# Patient Record
Sex: FEMALE | Race: BLACK OR AFRICAN AMERICAN | NOT HISPANIC OR LATINO | URBAN - METROPOLITAN AREA
[De-identification: names, ages, dates, MRNs, and addresses within clinical notes are randomized per-mention and may not be internally consistent; named-entity substitution may affect disease eponyms.]

---

## 2021-06-25 ENCOUNTER — INPATIENT (INPATIENT)
Facility: HOSPITAL | Age: 86
LOS: 3 days | Discharge: HOME | End: 2021-06-29
Attending: INTERNAL MEDICINE | Admitting: INTERNAL MEDICINE
Payer: MEDICARE

## 2021-06-25 VITALS
HEIGHT: 61 IN | HEART RATE: 73 BPM | RESPIRATION RATE: 15 BRPM | SYSTOLIC BLOOD PRESSURE: 139 MMHG | TEMPERATURE: 98 F | OXYGEN SATURATION: 100 % | DIASTOLIC BLOOD PRESSURE: 76 MMHG

## 2021-06-25 DIAGNOSIS — Z98.89 OTHER SPECIFIED POSTPROCEDURAL STATES: Chronic | ICD-10-CM

## 2021-06-25 DIAGNOSIS — R55 SYNCOPE AND COLLAPSE: ICD-10-CM

## 2021-06-25 LAB
ALBUMIN SERPL ELPH-MCNC: 4.7 G/DL — SIGNIFICANT CHANGE UP (ref 3.3–5)
ALP SERPL-CCNC: 77 U/L — SIGNIFICANT CHANGE UP (ref 40–120)
ALT FLD-CCNC: 20 U/L — SIGNIFICANT CHANGE UP (ref 4–33)
ANION GAP SERPL CALC-SCNC: 15 MMOL/L — HIGH (ref 7–14)
APPEARANCE UR: CLEAR — SIGNIFICANT CHANGE UP
AST SERPL-CCNC: 19 U/L — SIGNIFICANT CHANGE UP (ref 4–32)
BASOPHILS # BLD AUTO: 0.03 K/UL — SIGNIFICANT CHANGE UP (ref 0–0.2)
BASOPHILS NFR BLD AUTO: 0.7 % — SIGNIFICANT CHANGE UP (ref 0–2)
BILIRUB SERPL-MCNC: 0.4 MG/DL — SIGNIFICANT CHANGE UP (ref 0.2–1.2)
BILIRUB UR-MCNC: NEGATIVE — SIGNIFICANT CHANGE UP
BLOOD GAS VENOUS COMPREHENSIVE RESULT: SIGNIFICANT CHANGE UP
BUN SERPL-MCNC: 16 MG/DL — SIGNIFICANT CHANGE UP (ref 7–23)
CALCIUM SERPL-MCNC: 10.6 MG/DL — HIGH (ref 8.4–10.5)
CHLORIDE SERPL-SCNC: 106 MMOL/L — SIGNIFICANT CHANGE UP (ref 98–107)
CO2 SERPL-SCNC: 21 MMOL/L — LOW (ref 22–31)
COLOR SPEC: COLORLESS — SIGNIFICANT CHANGE UP
CREAT SERPL-MCNC: 1 MG/DL — SIGNIFICANT CHANGE UP (ref 0.5–1.3)
DIFF PNL FLD: NEGATIVE — SIGNIFICANT CHANGE UP
EOSINOPHIL # BLD AUTO: 0.22 K/UL — SIGNIFICANT CHANGE UP (ref 0–0.5)
EOSINOPHIL NFR BLD AUTO: 5.3 % — SIGNIFICANT CHANGE UP (ref 0–6)
GLUCOSE SERPL-MCNC: 100 MG/DL — HIGH (ref 70–99)
GLUCOSE UR QL: NEGATIVE — SIGNIFICANT CHANGE UP
HCT VFR BLD CALC: 42.2 % — SIGNIFICANT CHANGE UP (ref 34.5–45)
HGB BLD-MCNC: 14.2 G/DL — SIGNIFICANT CHANGE UP (ref 11.5–15.5)
IANC: 2.19 K/UL — SIGNIFICANT CHANGE UP (ref 1.5–8.5)
IMM GRANULOCYTES NFR BLD AUTO: 0.5 % — SIGNIFICANT CHANGE UP (ref 0–1.5)
KETONES UR-MCNC: NEGATIVE — SIGNIFICANT CHANGE UP
LEUKOCYTE ESTERASE UR-ACNC: NEGATIVE — SIGNIFICANT CHANGE UP
LYMPHOCYTES # BLD AUTO: 1.24 K/UL — SIGNIFICANT CHANGE UP (ref 1–3.3)
LYMPHOCYTES # BLD AUTO: 30 % — SIGNIFICANT CHANGE UP (ref 13–44)
MCHC RBC-ENTMCNC: 32.1 PG — SIGNIFICANT CHANGE UP (ref 27–34)
MCHC RBC-ENTMCNC: 33.6 GM/DL — SIGNIFICANT CHANGE UP (ref 32–36)
MCV RBC AUTO: 95.5 FL — SIGNIFICANT CHANGE UP (ref 80–100)
MONOCYTES # BLD AUTO: 0.44 K/UL — SIGNIFICANT CHANGE UP (ref 0–0.9)
MONOCYTES NFR BLD AUTO: 10.6 % — SIGNIFICANT CHANGE UP (ref 2–14)
NEUTROPHILS # BLD AUTO: 2.19 K/UL — SIGNIFICANT CHANGE UP (ref 1.8–7.4)
NEUTROPHILS NFR BLD AUTO: 52.9 % — SIGNIFICANT CHANGE UP (ref 43–77)
NITRITE UR-MCNC: NEGATIVE — SIGNIFICANT CHANGE UP
NRBC # BLD: 0 /100 WBCS — SIGNIFICANT CHANGE UP
NRBC # FLD: 0 K/UL — SIGNIFICANT CHANGE UP
PH UR: 7.5 — SIGNIFICANT CHANGE UP (ref 5–8)
PLATELET # BLD AUTO: 127 K/UL — LOW (ref 150–400)
POTASSIUM SERPL-MCNC: 4.7 MMOL/L — SIGNIFICANT CHANGE UP (ref 3.5–5.3)
POTASSIUM SERPL-SCNC: 4.7 MMOL/L — SIGNIFICANT CHANGE UP (ref 3.5–5.3)
PROT SERPL-MCNC: 8.3 G/DL — SIGNIFICANT CHANGE UP (ref 6–8.3)
PROT UR-MCNC: ABNORMAL
RBC # BLD: 4.42 M/UL — SIGNIFICANT CHANGE UP (ref 3.8–5.2)
RBC # FLD: 13.3 % — SIGNIFICANT CHANGE UP (ref 10.3–14.5)
SARS-COV-2 RNA SPEC QL NAA+PROBE: SIGNIFICANT CHANGE UP
SODIUM SERPL-SCNC: 142 MMOL/L — SIGNIFICANT CHANGE UP (ref 135–145)
SP GR SPEC: 1.01 — LOW (ref 1.01–1.02)
TROPONIN T, HIGH SENSITIVITY RESULT: 21 NG/L — SIGNIFICANT CHANGE UP
TROPONIN T, HIGH SENSITIVITY RESULT: 23 NG/L — SIGNIFICANT CHANGE UP
UROBILINOGEN FLD QL: SIGNIFICANT CHANGE UP
WBC # BLD: 4.14 K/UL — SIGNIFICANT CHANGE UP (ref 3.8–10.5)
WBC # FLD AUTO: 4.14 K/UL — SIGNIFICANT CHANGE UP (ref 3.8–10.5)

## 2021-06-25 PROCEDURE — 99285 EMERGENCY DEPT VISIT HI MDM: CPT | Mod: 25

## 2021-06-25 PROCEDURE — 99223 1ST HOSP IP/OBS HIGH 75: CPT

## 2021-06-25 PROCEDURE — 93010 ELECTROCARDIOGRAM REPORT: CPT

## 2021-06-25 PROCEDURE — 71045 X-RAY EXAM CHEST 1 VIEW: CPT | Mod: 26

## 2021-06-25 RX ORDER — PENTOXIFYLLINE 400 MG
400 TABLET, EXTENDED RELEASE ORAL
Refills: 0 | Status: DISCONTINUED | OUTPATIENT
Start: 2021-06-25 | End: 2021-06-29

## 2021-06-25 RX ORDER — BRIMONIDINE TARTRATE 2 MG/MG
1 SOLUTION/ DROPS OPHTHALMIC
Qty: 0 | Refills: 0 | DISCHARGE

## 2021-06-25 RX ORDER — ENOXAPARIN SODIUM 100 MG/ML
40 INJECTION SUBCUTANEOUS DAILY
Refills: 0 | Status: DISCONTINUED | OUTPATIENT
Start: 2021-06-25 | End: 2021-06-29

## 2021-06-25 RX ORDER — SPIRONOLACTONE 25 MG/1
25 TABLET, FILM COATED ORAL
Refills: 0 | Status: DISCONTINUED | OUTPATIENT
Start: 2021-06-25 | End: 2021-06-27

## 2021-06-25 RX ORDER — ALLOPURINOL 300 MG
1 TABLET ORAL
Qty: 0 | Refills: 0 | DISCHARGE

## 2021-06-25 RX ORDER — METOPROLOL TARTRATE 50 MG
1 TABLET ORAL
Qty: 0 | Refills: 0 | DISCHARGE

## 2021-06-25 RX ORDER — METOPROLOL TARTRATE 50 MG
25 TABLET ORAL DAILY
Refills: 0 | Status: DISCONTINUED | OUTPATIENT
Start: 2021-06-25 | End: 2021-06-29

## 2021-06-25 RX ORDER — METHIMAZOLE 10 MG/1
1 TABLET ORAL
Qty: 0 | Refills: 0 | DISCHARGE

## 2021-06-25 RX ORDER — AMLODIPINE BESYLATE 2.5 MG/1
10 TABLET ORAL AT BEDTIME
Refills: 0 | Status: DISCONTINUED | OUTPATIENT
Start: 2021-06-25 | End: 2021-06-26

## 2021-06-25 RX ORDER — PENTOXIFYLLINE 400 MG
1 TABLET, EXTENDED RELEASE ORAL
Qty: 0 | Refills: 0 | DISCHARGE

## 2021-06-25 RX ORDER — ALLOPURINOL 300 MG
100 TABLET ORAL AT BEDTIME
Refills: 0 | Status: DISCONTINUED | OUTPATIENT
Start: 2021-06-25 | End: 2021-06-29

## 2021-06-25 RX ORDER — BRIMONIDINE TARTRATE 2 MG/MG
1 SOLUTION/ DROPS OPHTHALMIC THREE TIMES A DAY
Refills: 0 | Status: DISCONTINUED | OUTPATIENT
Start: 2021-06-25 | End: 2021-06-29

## 2021-06-25 NOTE — ED ADULT TRIAGE NOTE - CHIEF COMPLAINT QUOTE
pt coming in from urgent care for an abnormal EKG. pt went to urgent care c/o dizziness which now subsided. pt appears to be in no distress. denies pain at this time, sob, dizziness, headache, lightheaded, nausea. hx. HTN, leaky valve.

## 2021-06-25 NOTE — ED PROVIDER NOTE - OBJECTIVE STATEMENT
89yo F pmhx HTN, HLD pw with episodes of dizziness    States yesterday and today had episode of light-headedness and weakness along with shoulder pain, today occurred while moving plants. no f/c, no n/v/d.   ECHO in january with dr keith eng which was normal.   does not smoke or drink

## 2021-06-25 NOTE — ED PROVIDER NOTE - ATTENDING CONTRIBUTION TO CARE
I have personally performed a face to face bedside history and physical examination of this patient. I have discussed the history, examination, review of systems, assessment and plan of management with the resident. I have reviewed the electronic medical record and amended it to reflect my history, review of systems, physical exam, assessment and plan.    Brief HPI:  89 yo F pmhx HTN, HLD presents for episodes of dizziness and reported abnormal ekg at urgent care prior to arrival.  Patient with episodes of dizziness not described as lightheadedness or room spinning sensation starting one day ago.  Currently improved.  Denies cp, sob, nausea, vomiting.  Went to urgent care today who told patient she had abnormal ekg and should come to ED.      Vitals:   Reviewed    Exam:    GEN:  Non-toxic appearing, non-distressed, speaking full sentences, non-diaphoretic, AAOx3  HEENT:  NCAT, neck supple, EOMI, PERRLA, sclera anicteric, no conjunctival pallor or injection, no stridor, normal voice, no tonsillar exudate, uvula midline  CV:  regular rhythm and rate, s1/s2 audible, no murmurs, rubs or gallops, peripheral pulses 2+ and symmetric  PULM:  non-labored respirations, lungs clear to auscultation bilaterally, no wheezes, crackles or rales  ABD:  non distended, non-tender, no rebound, no guarding, negative Boyd's sign, bowel sounds normal, no cvat  MSK:  no gross deformity, non-tender extremities and joints, range of motion grossly normal appearing, no extremity edema, extremities warm and well perfused   NEURO:  AAOx3, CN II-XII intact, motor 5/5 in upper and lower extremities bilaterally, sensation grossly intact in extremities and trunk, finger to nose testing wnl, no nystagmus, negative Romberg, no pronator drift, no gait deficit  SKIN:  warm, dry, no rash or vesicles     A/P:  89 yo F pmhx HTN, HLD presents for episodes of dizziness and reported abnormal ekg at urgent care prior to arrival.  Patient with episodes of dizziness not described as lightheadedness or room spinning sensation starting one day ago.  EKG sinus with age indeterminant infarct (q waves in precordium).  No e/o active ischemia.  Low c/f acute cta.  No apparent infection on exam or per history. Low c/f ACS at this time given resolved dizziness and no chest pain.  Plan for labs, cxr.  Dispo pending.

## 2021-06-25 NOTE — ED PROVIDER NOTE - CLINICAL SUMMARY MEDICAL DECISION MAKING FREE TEXT BOX
Rene PGY-3:  89yo F w/ pmhx as described in HPI here with ekg showing anterior q waves no previous to compare but indicating CAD here with pre-syncope, considering age and RF anticipate likely admission for further workup/management

## 2021-06-25 NOTE — ED PROVIDER NOTE - INTERNATIONAL TRAVEL
ANTICOAGULATION FOLLOW-UP CLINIC VISIT    Patient Name:  Zoey Jacobs  Date:  11/5/2018  Contact Type:  Face to Face    SUBJECTIVE:     Patient Findings     Positives No Problem Findings           OBJECTIVE    INR Protime   Date Value Ref Range Status   11/05/2018 2.5 2.0 - 3.0 Final       ASSESSMENT / PLAN  INR assessment THER    Recheck INR In: 5 WEEKS    INR Location Clinic      Anticoagulation Summary as of 11/5/2018     INR goal 2.0-3.0   Today's INR 2.5   Warfarin maintenance plan 5 mg (5 mg x 1) on Mon, Wed, Fri; 2.5 mg (5 mg x 0.5) all other days   Full warfarin instructions 5 mg on Mon, Wed, Fri; 2.5 mg all other days   Weekly warfarin total 25 mg   Plan last modified Maryuri Vines RN (3/19/2018)   Next INR check 12/10/2018   Priority INR   Target end date Indefinite    Indications   Anticoagulation monitoring  INR range 2-3 [Z79.01]  Paroxysmal atrial fibrillation (H) [I48.0]         Anticoagulation Episode Summary     INR check location     Preferred lab     Send INR reminders to  INR    Comments       Anticoagulation Care Providers     Provider Role Specialty Phone number    Vijay Mackay MD [facility] [phone]            See the Encounter Report to view Anticoagulation Flowsheet and Dosing Calendar (Go to Encounters tab in chart review, and find the Anticoagulation Therapy Visit)        Maryuri Vines RN                No

## 2021-06-25 NOTE — ED PROVIDER NOTE - PHYSICAL EXAMINATION
General: well appearing, interactive, well nourished, NAD  HEENT: normal external ears bilaterally   Cardiac: RRR, no MRG appreciated  Resp: lungs clear to auscultation bilaterally, symmetric chest wall rise  Abd: soft, nontender, nondistended,   : no CVA tenderness  Neuro: Moving all extremities  Skin:  normal color for patient

## 2021-06-26 DIAGNOSIS — Z29.9 ENCOUNTER FOR PROPHYLACTIC MEASURES, UNSPECIFIED: ICD-10-CM

## 2021-06-26 DIAGNOSIS — M25.519 PAIN IN UNSPECIFIED SHOULDER: ICD-10-CM

## 2021-06-26 DIAGNOSIS — R42 DIZZINESS AND GIDDINESS: ICD-10-CM

## 2021-06-26 DIAGNOSIS — I10 ESSENTIAL (PRIMARY) HYPERTENSION: ICD-10-CM

## 2021-06-26 DIAGNOSIS — E05.90 THYROTOXICOSIS, UNSPECIFIED WITHOUT THYROTOXIC CRISIS OR STORM: ICD-10-CM

## 2021-06-26 DIAGNOSIS — D69.6 THROMBOCYTOPENIA, UNSPECIFIED: ICD-10-CM

## 2021-06-26 DIAGNOSIS — M10.9 GOUT, UNSPECIFIED: ICD-10-CM

## 2021-06-26 LAB
A1C WITH ESTIMATED AVERAGE GLUCOSE RESULT: 5.5 % — SIGNIFICANT CHANGE UP (ref 4–5.6)
ANION GAP SERPL CALC-SCNC: 12 MMOL/L — SIGNIFICANT CHANGE UP (ref 7–14)
BUN SERPL-MCNC: 17 MG/DL — SIGNIFICANT CHANGE UP (ref 7–23)
CALCIUM SERPL-MCNC: 10.2 MG/DL — SIGNIFICANT CHANGE UP (ref 8.4–10.5)
CHLORIDE SERPL-SCNC: 105 MMOL/L — SIGNIFICANT CHANGE UP (ref 98–107)
CHOLEST SERPL-MCNC: 210 MG/DL — HIGH
CO2 SERPL-SCNC: 23 MMOL/L — SIGNIFICANT CHANGE UP (ref 22–31)
CREAT SERPL-MCNC: 0.96 MG/DL — SIGNIFICANT CHANGE UP (ref 0.5–1.3)
ESTIMATED AVERAGE GLUCOSE: 111 MG/DL — SIGNIFICANT CHANGE UP (ref 68–114)
GLUCOSE SERPL-MCNC: 93 MG/DL — SIGNIFICANT CHANGE UP (ref 70–99)
HCT VFR BLD CALC: 41.5 % — SIGNIFICANT CHANGE UP (ref 34.5–45)
HDLC SERPL-MCNC: 70 MG/DL — SIGNIFICANT CHANGE UP
HGB BLD-MCNC: 14 G/DL — SIGNIFICANT CHANGE UP (ref 11.5–15.5)
LIPID PNL WITH DIRECT LDL SERPL: 123 MG/DL — HIGH
MAGNESIUM SERPL-MCNC: 2.1 MG/DL — SIGNIFICANT CHANGE UP (ref 1.6–2.6)
MCHC RBC-ENTMCNC: 32.6 PG — SIGNIFICANT CHANGE UP (ref 27–34)
MCHC RBC-ENTMCNC: 33.7 GM/DL — SIGNIFICANT CHANGE UP (ref 32–36)
MCV RBC AUTO: 96.5 FL — SIGNIFICANT CHANGE UP (ref 80–100)
NON HDL CHOLESTEROL: 140 MG/DL — HIGH
NRBC # BLD: 0 /100 WBCS — SIGNIFICANT CHANGE UP
NRBC # FLD: 0 K/UL — SIGNIFICANT CHANGE UP
PHOSPHATE SERPL-MCNC: 2.8 MG/DL — SIGNIFICANT CHANGE UP (ref 2.5–4.5)
PLATELET # BLD AUTO: 136 K/UL — LOW (ref 150–400)
POTASSIUM SERPL-MCNC: 4.2 MMOL/L — SIGNIFICANT CHANGE UP (ref 3.5–5.3)
POTASSIUM SERPL-SCNC: 4.2 MMOL/L — SIGNIFICANT CHANGE UP (ref 3.5–5.3)
RBC # BLD: 4.3 M/UL — SIGNIFICANT CHANGE UP (ref 3.8–5.2)
RBC # FLD: 13.4 % — SIGNIFICANT CHANGE UP (ref 10.3–14.5)
SODIUM SERPL-SCNC: 140 MMOL/L — SIGNIFICANT CHANGE UP (ref 135–145)
TRIGL SERPL-MCNC: 83 MG/DL — SIGNIFICANT CHANGE UP
TSH SERPL-MCNC: 2.69 UIU/ML — SIGNIFICANT CHANGE UP (ref 0.27–4.2)
WBC # BLD: 4.95 K/UL — SIGNIFICANT CHANGE UP (ref 3.8–10.5)
WBC # FLD AUTO: 4.95 K/UL — SIGNIFICANT CHANGE UP (ref 3.8–10.5)

## 2021-06-26 RX ORDER — AMLODIPINE BESYLATE 2.5 MG/1
5 TABLET ORAL AT BEDTIME
Refills: 0 | Status: DISCONTINUED | OUTPATIENT
Start: 2021-06-26 | End: 2021-06-29

## 2021-06-26 RX ADMIN — BRIMONIDINE TARTRATE 1 DROP(S): 2 SOLUTION/ DROPS OPHTHALMIC at 14:29

## 2021-06-26 RX ADMIN — Medication 400 MILLIGRAM(S): at 06:14

## 2021-06-26 RX ADMIN — Medication 400 MILLIGRAM(S): at 17:21

## 2021-06-26 RX ADMIN — BRIMONIDINE TARTRATE 1 DROP(S): 2 SOLUTION/ DROPS OPHTHALMIC at 22:41

## 2021-06-26 RX ADMIN — BRIMONIDINE TARTRATE 1 DROP(S): 2 SOLUTION/ DROPS OPHTHALMIC at 06:15

## 2021-06-26 RX ADMIN — AMLODIPINE BESYLATE 5 MILLIGRAM(S): 2.5 TABLET ORAL at 22:41

## 2021-06-26 RX ADMIN — Medication 100 MILLIGRAM(S): at 22:41

## 2021-06-26 RX ADMIN — Medication 25 MILLIGRAM(S): at 09:06

## 2021-06-26 NOTE — H&P ADULT - NSHPREVIEWOFSYSTEMS_GEN_ALL_CORE
CONSTITUTIONAL:  No weight loss, fever, chills, weakness or fatigue.  HEENT:  Eyes:  No visual loss, blurred vision, double vision or yellow sclerae. Ears, Nose, Throat:  No hearing loss, sneezing, congestion, runny nose or sore throat.  SKIN:  No rash or itching.  CARDIOVASCULAR:  No chest pain, chest pressure or chest discomfort. No palpitations or edema.  RESPIRATORY:  No shortness of breath, cough or sputum.  GASTROINTESTINAL:  No anorexia, nausea, vomiting or diarrhea. No abdominal pain or blood.  GENITOURINARY:  Denies hematuria, dysuria.   NEUROLOGICAL:  +dizziness +peripheral neuropathy No headache, syncope, paralysis, ataxia. No change in bowel or bladder control.  MUSCULOSKELETAL:  +shoulder pain No muscle, back pain.  HEMATOLOGIC:  No anemia, bleeding or bruising.  LYMPHATICS:  No enlarged nodes. No history of splenectomy.  PSYCHIATRIC:  No history of depression or anxiety.  ENDOCRINOLOGIC:  No reports of sweating, cold or heat intolerance. No polyuria or polydipsia.  ALLERGIES:  No history of asthma, hives, eczema or rhinitis.

## 2021-06-26 NOTE — H&P ADULT - PROBLEM SELECTOR PLAN 2
-likely MSK strain. Full ROM without pain at present. Xray of the shoulder wnl to my eye  -pain control  -PT juan manuel

## 2021-06-26 NOTE — H&P ADULT - HISTORY OF PRESENT ILLNESS
89yo F pmhx HTN, HLD, hyperthyroidism (on methimazole), gout, PVD?, peripheral neuropathy pw with episodes of dizziness/lightheadedness. Pt reports long history of dizziness that occurs when standing. Denies room spinning, instead reports a sensation of lightheadedness. Pt states episodes do not occur when sitting/laying down. Episodes usually resolve after a few minutes of sitting. Denies associated chest pain, blackening of vision, palpitations or SOB. Denies fevers, chills, or focal weakness. Ambulates with a cane. Reports baseline neuropathy in toes. Pt also reporting acute onset of right shoulder pain that started after moving plants. Denies any acute trauma. Pain currently resolved without intervention. Of note, pt had an TTE in january with dr keith eng that was reportedly normal.    no blurred vision

## 2021-06-26 NOTE — PATIENT PROFILE ADULT - PHONE #
----- Message from Brittany Prater sent at 10/16/2020 11:34 AM CDT -----  Regarding: Medications  Contact: Patient  Type: Needs Medical Advice  Who Called:  Patient    Pharmacy name and phone #:   [id number] - [place], [place] - [address];  Best Call Back Number: [phone]  Additional Information: patient called and stated he just picked up one prescription from the pharmacy he thought there was supposed to be two medications. He would like a call from the office to clarify. Thank you!      
Clarified prescription with patient and patient verbalized understanding  
[phone]

## 2021-06-26 NOTE — PHYSICAL THERAPY INITIAL EVALUATION ADULT - PERTINENT HX OF CURRENT PROBLEM, REHAB EVAL
89 year old Female pmhx HTN, HLD, hyperthyroidism, gout, peripheral neuropathy presents with episodes of dizziness/lightheadedness

## 2021-06-26 NOTE — CONSULT NOTE ADULT - ASSESSMENT
89yo F pmhx HTN, HLD, hyperthyroidism (on methimazole), gout, PVD?, peripheral neuropathy pw with episodes of dizziness/lightheadedness likely 2/2 postural hypotension in the setting of polypharmacy   Nephrology consulted for hypercalcemia       Hypercalcemia  etiology?  check PTH, PO4, Vitamin D   Consider gentle hydration ( Ns at 50cc/hr for 1 day )  Monitor serum calcium     HTN  BP stable  Monitor BP    Proteinuria  Repeat UA  Monitor at present

## 2021-06-26 NOTE — H&P ADULT - NSHPPHYSICALEXAM_GEN_ALL_CORE
GENERAL APPEARANCE: Well developed, frail, alert and cooperative. NAD.   HEENT:  PERRL, EOMI. External auditory canals normal, hearing grossly intact.  NECK: Neck supple, non-tender without lymphadenopathy, masses or thyromegaly.  CARDIAC: Normal S1 and S2. No S3, S4 or murmurs. Rhythm is regular.  LUNGS: Clear to auscultation and percussion without rales, rhonchi, wheezing or diminished breath sounds.  ABDOMEN: Positive bowel sounds. Soft, nondistended, nontender. No guarding or rebound.   MUSCULOSKELETAL: ROM intact of shoulder, no pain at present, ROM intact spine and extremities. No joint erythema or tenderness.   BACK: normal posture, no spinal deformity, symmetry of spinal muscles, without tenderness, decreased range of motion.  EXTREMITIES: No significant deformity or joint abnormality. No edema. Peripheral pulses intact. No varicosities.  NEUROLOGICAL: CN II-XII intact. Strength and sensation symmetric and intact throughout. Mild horizontal nystagmus   SKIN: Small hematoma on right wrist over radial artery. Tophi on multiple fingers  PSYCHIATRIC: AOx3.Normal affect and behavior. GENERAL APPEARANCE: Well developed, frail, alert and cooperative. NAD.   HEENT:  PERRL, EOMI. External auditory canals normal, hearing grossly intact.  NECK: Neck supple, non-tender without lymphadenopathy, masses or thyromegaly.  CARDIAC: Normal S1 and S2. No S3, S4 or murmurs. Rhythm is regular.  LUNGS: Clear to auscultation and percussion without rales, rhonchi, wheezing or diminished breath sounds.  ABDOMEN: Positive bowel sounds. Soft, nondistended, nontender. No guarding or rebound.   MUSCULOSKELETAL: ROM intact of shoulder, no pain at present, ROM intact spine and extremities. No joint erythema or tenderness.   BACK: normal posture, no spinal deformity, symmetry of spinal muscles, without tenderness, decreased range of motion.  EXTREMITIES: No significant deformity or joint abnormality. No edema. Peripheral pulses intact. No varicosities.  NEUROLOGICAL: CN II-XII intact. Strength and sensation symmetric and intact throughout. Mild horizontal nystagmus   SKIN: Small hematoma on right wrist over radial artery without numbness, tingling or pain. Tophi on multiple fingers  PSYCHIATRIC: AOx3.Normal affect and behavior.

## 2021-06-26 NOTE — H&P ADULT - ASSESSMENT
89yo F pmhx HTN, HLD, hyperthyroidism (on methimazole), gout, PVD?, peripheral neuropathy pw with episodes of dizziness/lightheadedness likely 2/2 postural hypotension in the setting of polypharmacy

## 2021-06-26 NOTE — H&P ADULT - NSICDXPASTMEDICALHX_GEN_ALL_CORE_FT
PAST MEDICAL HISTORY:  Gout     HTN (hypertension)     Hyperthyroidism     Osteoporosis     Pulmonary embolism

## 2021-06-26 NOTE — H&P ADULT - NSHPLABSRESULTS_GEN_ALL_CORE
( @ 20:28)                      14.2  4.14 )-----------( 127                 42.2    Neutrophils = 2.19 (52.9%)  Lymphocytes = 1.24 (30.0%)  Eosinophils = 0.22 (5.3%)  Basophils = 0.03 (0.7%)  Monocytes = 0.44 (10.6%)  Bands = --%        142  |  106  |  16  ----------------------------<  100<H>  4.7   |  21<L>  |  1.00    Ca    10.6<H>      2021 20:28    TPro  8.3  /  Alb  4.7  /  TBili  0.4  /  DBili  x   /  AST  19  /  ALT  20  /  AlkPhos  77          Venous Blood Gas:   @ 20:28  7.43/38/37/25/70.7  VBG Lactate: 1.6        Urinalysis Basic - ( 2021 21:31 )    Color: Colorless / Appearance: Clear / S.008 / pH: x  Gluc: x / Ketone: Negative  / Bili: Negative / Urobili: <2 mg/dL   Blood: x / Protein: Trace / Nitrite: Negative   Leuk Esterase: Negative / RBC: x / WBC x   Sq Epi: x / Non Sq Epi: x / Bacteria: x    < from: Xray Chest 1 View AP/PA (21 @ 20:48) >    INTERPRETATION:  Questionable retrocardiac opacity. Lateral radiograph of the chest is recommended for further characterization.    < end of copied text >    Labs and imaging reviewed   EKG: NSR, no acute ST changes

## 2021-06-26 NOTE — CONSULT NOTE ADULT - ASSESSMENT
89yo F pmhx HTN, HLD, hyperthyroidism (on methimazole), gout, PVD?, peripheral neuropathy pw with episodes of dizziness/lightheadedness likely 2/2 postural hypotension in the setting of polypharmacy     # Lightheadedness.   -hold spironolactone for today and tomorrow. Reduce amlodipine to 5 mg   -check orthostatics   -TTE to further evaluate  -monitor on telemetry  -PT eval.     # Shoulder pain.    -likely MSK strain. Full ROM without pain at present. Xray of the shoulder wnl   -pain control: tylenol  -PT eval.     # Hyperthyroidism.   -c/w methimazole  -TSH pending.     HTN (hypertension).    -c/w metoprolol, reduce amlodipine  -monitor BP.     Thrombocytopenia.    -perhaps 2/2 mild MDS  -no signs of bleeding. Trend.     # Gout.   -no active disease presently   -c/w allopurinol.    Need for prophylactic measure.  Plan: DVT ppx: lovenox.

## 2021-06-26 NOTE — H&P ADULT - PROBLEM SELECTOR PLAN 1
-Pt reports episodes of dizziness/lightheadedness that occur only when standing. Resolve with sitting. No focal deficits on exam.  -lower suspicion for central etiology of dizziness. Suspect likely postural/orthostatic hypotension in the setting of multiple BP meds (amlodipine and metoprolol) and diuretics (unclear why, no HF history, will need to clarify in AM)  -hold spironolactone for today and tomorrow. Reduce amlodipine to 5 mg   -check orthostatics   -TTE to further evaluate  -UA clean. CXR with possible opacity however pt denies cough. No other localizing symptoms of infx   -monitor on telemetry  -PT eval

## 2021-06-27 LAB
24R-OH-CALCIDIOL SERPL-MCNC: 32 NG/ML — SIGNIFICANT CHANGE UP (ref 30–80)
ANION GAP SERPL CALC-SCNC: 14 MMOL/L — SIGNIFICANT CHANGE UP (ref 7–14)
BUN SERPL-MCNC: 23 MG/DL — SIGNIFICANT CHANGE UP (ref 7–23)
CALCIUM SERPL-MCNC: 9.8 MG/DL — SIGNIFICANT CHANGE UP (ref 8.4–10.5)
CHLORIDE SERPL-SCNC: 104 MMOL/L — SIGNIFICANT CHANGE UP (ref 98–107)
CHOLEST SERPL-MCNC: 193 MG/DL — SIGNIFICANT CHANGE UP
CO2 SERPL-SCNC: 20 MMOL/L — LOW (ref 22–31)
COVID-19 SPIKE DOMAIN AB INTERP: POSITIVE
COVID-19 SPIKE DOMAIN ANTIBODY RESULT: >250 U/ML — HIGH
CREAT SERPL-MCNC: 1.04 MG/DL — SIGNIFICANT CHANGE UP (ref 0.5–1.3)
GLUCOSE SERPL-MCNC: 101 MG/DL — HIGH (ref 70–99)
HCT VFR BLD CALC: 39.5 % — SIGNIFICANT CHANGE UP (ref 34.5–45)
HDLC SERPL-MCNC: 64 MG/DL — SIGNIFICANT CHANGE UP
HGB BLD-MCNC: 13.2 G/DL — SIGNIFICANT CHANGE UP (ref 11.5–15.5)
LIPID PNL WITH DIRECT LDL SERPL: 116 MG/DL — HIGH
MCHC RBC-ENTMCNC: 32.3 PG — SIGNIFICANT CHANGE UP (ref 27–34)
MCHC RBC-ENTMCNC: 33.4 GM/DL — SIGNIFICANT CHANGE UP (ref 32–36)
MCV RBC AUTO: 96.6 FL — SIGNIFICANT CHANGE UP (ref 80–100)
NON HDL CHOLESTEROL: 129 MG/DL — SIGNIFICANT CHANGE UP
NRBC # BLD: 0 /100 WBCS — SIGNIFICANT CHANGE UP
NRBC # FLD: 0 K/UL — SIGNIFICANT CHANGE UP
PHOSPHATE SERPL-MCNC: 2.8 MG/DL — SIGNIFICANT CHANGE UP (ref 2.5–4.5)
PLATELET # BLD AUTO: 123 K/UL — LOW (ref 150–400)
POTASSIUM SERPL-MCNC: 3.9 MMOL/L — SIGNIFICANT CHANGE UP (ref 3.5–5.3)
POTASSIUM SERPL-SCNC: 3.9 MMOL/L — SIGNIFICANT CHANGE UP (ref 3.5–5.3)
PTH-INTACT FLD-MCNC: 102 PG/ML — HIGH (ref 15–65)
RBC # BLD: 4.09 M/UL — SIGNIFICANT CHANGE UP (ref 3.8–5.2)
RBC # FLD: 13.2 % — SIGNIFICANT CHANGE UP (ref 10.3–14.5)
SARS-COV-2 IGG+IGM SERPL QL IA: >250 U/ML — HIGH
SARS-COV-2 IGG+IGM SERPL QL IA: POSITIVE
SODIUM SERPL-SCNC: 138 MMOL/L — SIGNIFICANT CHANGE UP (ref 135–145)
TRIGL SERPL-MCNC: 65 MG/DL — SIGNIFICANT CHANGE UP
WBC # BLD: 4.43 K/UL — SIGNIFICANT CHANGE UP (ref 3.8–10.5)
WBC # FLD AUTO: 4.43 K/UL — SIGNIFICANT CHANGE UP (ref 3.8–10.5)

## 2021-06-27 RX ADMIN — AMLODIPINE BESYLATE 5 MILLIGRAM(S): 2.5 TABLET ORAL at 21:29

## 2021-06-27 RX ADMIN — BRIMONIDINE TARTRATE 1 DROP(S): 2 SOLUTION/ DROPS OPHTHALMIC at 21:29

## 2021-06-27 RX ADMIN — BRIMONIDINE TARTRATE 1 DROP(S): 2 SOLUTION/ DROPS OPHTHALMIC at 05:36

## 2021-06-27 RX ADMIN — BRIMONIDINE TARTRATE 1 DROP(S): 2 SOLUTION/ DROPS OPHTHALMIC at 13:17

## 2021-06-27 RX ADMIN — Medication 400 MILLIGRAM(S): at 17:16

## 2021-06-27 RX ADMIN — Medication 400 MILLIGRAM(S): at 05:36

## 2021-06-27 RX ADMIN — Medication 25 MILLIGRAM(S): at 05:37

## 2021-06-27 RX ADMIN — Medication 100 MILLIGRAM(S): at 21:29

## 2021-06-28 ENCOUNTER — TRANSCRIPTION ENCOUNTER (OUTPATIENT)
Age: 86
End: 2021-06-28

## 2021-06-28 LAB
ANION GAP SERPL CALC-SCNC: 12 MMOL/L — SIGNIFICANT CHANGE UP (ref 7–14)
BUN SERPL-MCNC: 23 MG/DL — SIGNIFICANT CHANGE UP (ref 7–23)
CALCIUM SERPL-MCNC: 10.2 MG/DL — SIGNIFICANT CHANGE UP (ref 8.4–10.5)
CHLORIDE SERPL-SCNC: 106 MMOL/L — SIGNIFICANT CHANGE UP (ref 98–107)
CO2 SERPL-SCNC: 20 MMOL/L — LOW (ref 22–31)
CREAT SERPL-MCNC: 1.05 MG/DL — SIGNIFICANT CHANGE UP (ref 0.5–1.3)
GLUCOSE SERPL-MCNC: 84 MG/DL — SIGNIFICANT CHANGE UP (ref 70–99)
HCT VFR BLD CALC: 40.9 % — SIGNIFICANT CHANGE UP (ref 34.5–45)
HGB BLD-MCNC: 13.8 G/DL — SIGNIFICANT CHANGE UP (ref 11.5–15.5)
MCHC RBC-ENTMCNC: 32.4 PG — SIGNIFICANT CHANGE UP (ref 27–34)
MCHC RBC-ENTMCNC: 33.7 GM/DL — SIGNIFICANT CHANGE UP (ref 32–36)
MCV RBC AUTO: 96 FL — SIGNIFICANT CHANGE UP (ref 80–100)
NRBC # BLD: 0 /100 WBCS — SIGNIFICANT CHANGE UP
NRBC # FLD: 0 K/UL — SIGNIFICANT CHANGE UP
PLATELET # BLD AUTO: 123 K/UL — LOW (ref 150–400)
POTASSIUM SERPL-MCNC: 4 MMOL/L — SIGNIFICANT CHANGE UP (ref 3.5–5.3)
POTASSIUM SERPL-SCNC: 4 MMOL/L — SIGNIFICANT CHANGE UP (ref 3.5–5.3)
RBC # BLD: 4.26 M/UL — SIGNIFICANT CHANGE UP (ref 3.8–5.2)
RBC # FLD: 13.2 % — SIGNIFICANT CHANGE UP (ref 10.3–14.5)
SODIUM SERPL-SCNC: 138 MMOL/L — SIGNIFICANT CHANGE UP (ref 135–145)
VIT D25+D1,25 OH+D1,25 PNL SERPL-MCNC: 47.5 PG/ML — SIGNIFICANT CHANGE UP (ref 19.9–79.3)
WBC # BLD: 5.77 K/UL — SIGNIFICANT CHANGE UP (ref 3.8–10.5)
WBC # FLD AUTO: 5.77 K/UL — SIGNIFICANT CHANGE UP (ref 3.8–10.5)

## 2021-06-28 PROCEDURE — 93306 TTE W/DOPPLER COMPLETE: CPT | Mod: 26

## 2021-06-28 RX ADMIN — BRIMONIDINE TARTRATE 1 DROP(S): 2 SOLUTION/ DROPS OPHTHALMIC at 13:07

## 2021-06-28 RX ADMIN — AMLODIPINE BESYLATE 5 MILLIGRAM(S): 2.5 TABLET ORAL at 21:38

## 2021-06-28 RX ADMIN — BRIMONIDINE TARTRATE 1 DROP(S): 2 SOLUTION/ DROPS OPHTHALMIC at 05:21

## 2021-06-28 RX ADMIN — Medication 25 MILLIGRAM(S): at 05:21

## 2021-06-28 RX ADMIN — BRIMONIDINE TARTRATE 1 DROP(S): 2 SOLUTION/ DROPS OPHTHALMIC at 21:39

## 2021-06-28 RX ADMIN — Medication 400 MILLIGRAM(S): at 18:51

## 2021-06-28 RX ADMIN — Medication 400 MILLIGRAM(S): at 05:21

## 2021-06-28 RX ADMIN — Medication 100 MILLIGRAM(S): at 21:39

## 2021-06-28 NOTE — CONSULT NOTE ADULT - SUBJECTIVE AND OBJECTIVE BOX
[facility] NEPHROLOGY PRACTICE   MD JANI DASILVA MD RUORU WONG, [place]    TEL:  OFFICE: [phone]  DR LOWERY CELL: [phone]  ALEXIS HAINES CELL: [phone]  DR. GILL CELL: [phone]  DR. PINO CELl: [phone]    FROM 5 PM- 7 AM PLEASE CALL ANSWERING SERVICE AT [phone]    -- INITIAL RENAL CONSULT NOTE --- Date Of service 06-26-21 @ 10:43  --------------------------------------------------------------------------------  HPI:    89yo F pmhx HTN, HLD, hyperthyroidism (on methimazole), gout, PVD?, peripheral neuropathy pw with episodes of dizziness/lightheadedness likely 2/2 postural hypotension in the setting of polypharmacy   Nephrology consulted for hypercalcemia     PAST HISTORY  --------------------------------------------------------------------------------  PAST MEDICAL & SURGICAL HISTORY:  HTN (hypertension)    Osteoporosis    Pulmonary embolism    Gout    Hyperthyroidism    S/P appendectomy      FAMILY HISTORY:  No pertinent family history in first degree relatives      PAST SOCIAL HISTORY:    ALLERGIES & MEDICATIONS  --------------------------------------------------------------------------------  Allergies    No Known Allergies    Intolerances      Standing Inpatient Medications  allopurinol 100 milliGRAM(s) Oral at bedtime  amLODIPine   Tablet 5 milliGRAM(s) Oral at bedtime  brimonidine 0.2% Ophthalmic Solution 1 Drop(s) Both EYES three times a day  enoxaparin Injectable 40 milliGRAM(s) SubCutaneous daily  methimazole 5 milliGRAM(s) Oral <User Schedule>  metoprolol succinate ER 25 milliGRAM(s) Oral daily  pentoxifylline 400 milliGRAM(s) Oral two times a day  spironolactone 25 milliGRAM(s) Oral <User Schedule>    PRN Inpatient Medications      REVIEW OF SYSTEMS  --------------------------------------------------------------------------------  Gen: No fevers/chills  Skin: No rashes  Head/Eyes/Ears: Normal hearing,  Normal vision   Respiratory: No dyspnea, cough  CV: No chest pain  GI: No abdominal pain, diarrhea, constipation, nausea, vomiting  : No dysuria, hematuria  MSK: No  edema  Heme: No easy bruising or bleeding  Psych: No significant depression    All other systems were reviewed and are negative, except as noted.    VITALS/PHYSICAL EXAM  --------------------------------------------------------------------------------  T(C): 36.8 (06-26-21 @ 08:53), Max: 37 (06-26-21 @ 06:00)  HR: 68 (06-26-21 @ 08:53) (57 - 75)  BP: 116/68 (06-26-21 @ 08:53) (116/68 - 153/82)  RR: 18 (06-26-21 @ 08:53) (15 - 19)  SpO2: 100% (06-26-21 @ 08:53) (97% - 100%)  Wt(kg): --  Height (cm): 154.9 (06-25-21 @ 17:15)  Weight (kg): 58.6 (06-26-21 @ 06:00)  BMI (kg/m2): 24.4 (06-26-21 @ 06:00)  BSA (m2): 1.57 (06-26-21 @ 06:00)      Physical Exam:  	Gen: NAD  	HEENT: MMM  	Pulm: CTA B/L  	CV: S1S2  	Abd: Soft, +BS   	Ext: No LE edema B/L  	Neuro: Awake, alert  	Skin: Warm and dry  	Vascular access:          : dawson  LABS/STUDIES  --------------------------------------------------------------------------------              14.2   4.14  >-----------<  127      [06-25-21 @ 20:28]              42.2     142  |  106  |  16  ----------------------------<  100      [06-25-21 @ 20:28]  4.7   |  21  |  1.00        Ca     10.6     [06-25-21 @ 20:28]    TPro  8.3  /  Alb  4.7  /  TBili  0.4  /  DBili  x   /  AST  19  /  ALT  20  /  AlkPhos  77  [06-25-21 @ 20:28]          Creatinine Trend:  SCr 1.00 [06-25 @ 20:28]    Urinalysis - [06-25-21 @ 21:31]      Color Colorless / Appearance Clear / SG 1.008 / pH 7.5      Gluc Negative / Ketone Negative  / Bili Negative / Urobili <2 mg/dL       Blood Negative / Protein Trace / Leuk Est Negative / Nitrite Negative      RBC  / WBC  / Hyaline  / Gran  / Sq Epi  / Non Sq Epi  / Bacteria           
89yo F pmhx HTN, HLD, hyperthyroidism (on methimazole), gout, PVD?, peripheral neuropathy pw with episodes of dizziness/lightheadedness. Pt reports long history of dizziness that occurs when standing. Denies room spinning, instead reports a sensation of lightheadedness. Pt states episodes do not occur when sitting/laying down. Episodes usually resolve after a few minutes of sitting. Denies associated chest pain, blackening of vision, palpitations or SOB. Denies fevers, chills, or focal weakness. Ambulates with a cane. Reports baseline neuropathy in toes. Pt also reporting acute onset of right shoulder pain that started after moving plants. Denies any acute trauma. Pain currently resolved without intervention      INTERVAL HPI/OVERNIGHT EVENTS:  T(C): 37.1 (21 @ 21:06), Max: 37.1 (21 @ 21:06)  HR: 65 (21 @ 22:39) (57 - 72)  BP: 117/70 (21 @ 22:39) (116/68 - 141/63)  RR: 17 (21 @ 21:06) (17 - 18)  SpO2: 99% (21 @ 21:06) (97% - 100%)  Wt(kg): --  I&O's Summary      PAST MEDICAL & SURGICAL HISTORY:  HTN (hypertension)    Osteoporosis    Pulmonary embolism    Gout    Hyperthyroidism    S/P appendectomy        SOCIAL HISTORY  Alcohol:  Tobacco:  Illicit substance use:    FAMILY HISTORY:    REVIEW OF SYSTEMS:  CONSTITUTIONAL: No fever, weight loss, or fatigue  EYES: No eye pain, visual disturbances, or discharge  ENMT:  No difficulty hearing, tinnitus, vertigo; No sinus or throat pain  NECK: No pain or stiffness  RESPIRATORY: No cough, wheezing, chills or hemoptysis; No shortness of breath  CARDIOVASCULAR: No chest pain, palpitations, dizziness, or leg swelling  GASTROINTESTINAL: No abdominal or epigastric pain. No nausea, vomiting, or hematemesis; No diarrhea or constipation. No melena or hematochezia.  GENITOURINARY: No dysuria, frequency, hematuria, or incontinence  NEUROLOGICAL: No headaches, memory loss, loss of strength, numbness, or tremors  SKIN: No itching, burning, rashes, or lesions   LYMPH NODES: No enlarged glands  ENDOCRINE: No heat or cold intolerance; No hair loss  MUSCULOSKELETAL: No joint pain or swelling; No muscle, back, or extremity pain  PSYCHIATRIC: No depression, anxiety, mood swings, or difficulty sleeping  HEME/LYMPH: No easy bruising, or bleeding gums  ALLERY AND IMMUNOLOGIC: No hives or eczema    RADIOLOGY & ADDITIONAL TESTS:    Imaging Personally Reviewed:  [ ] YES  [ ] NO    Consultant(s) Notes Reviewed:  [ ] YES  [ ] NO    PHYSICAL EXAM:  GENERAL: NAD, well-groomed, well-developed  HEAD:  Atraumatic, Normocephalic  EYES: EOMI, PERRLA, conjunctiva and sclera clear  ENMT: No tonsillar erythema, exudates, or enlargement; Moist mucous membranes, Good dentition, No lesions  NECK: Supple, No JVD, Normal thyroid  NERVOUS SYSTEM:  Alert & Oriented X3, Good concentration; Motor Strength 5/5 B/L upper and lower extremities; DTRs 2+ intact and symmetric  CHEST/LUNG: Clear to percussion bilaterally; No rales, rhonchi, wheezing, or rubs  HEART: Regular rate and rhythm; No murmurs, rubs, or gallops  ABDOMEN: Soft, Nontender, Nondistended; Bowel sounds present  EXTREMITIES:  2+ Peripheral Pulses, No clubbing, cyanosis, or edema  LYMPH: No lymphadenopathy noted  SKIN: No rashes or lesions    LABS:                        14.0   4.95  )-----------( 136      ( 2021 10:54 )             41.5         140  |  105  |  17  ----------------------------<  93  4.2   |  23  |  0.96    Ca    10.2      2021 10:54  Phos  2.8     -  Mg     2.1         TPro  8.3  /  Alb  4.7  /  TBili  0.4  /  DBili  x   /  AST  19  /  ALT  20  /  AlkPhos  77        Urinalysis Basic - ( 2021 21:31 )    Color: Colorless / Appearance: Clear / S.008 / pH: x  Gluc: x / Ketone: Negative  / Bili: Negative / Urobili: <2 mg/dL   Blood: x / Protein: Trace / Nitrite: Negative   Leuk Esterase: Negative / RBC: x / WBC x   Sq Epi: x / Non Sq Epi: x / Bacteria: x      CAPILLARY BLOOD GLUCOSE            Urinalysis Basic - ( 2021 21:31 )    Color: Colorless / Appearance: Clear / S.008 / pH: x  Gluc: x / Ketone: Negative  / Bili: Negative / Urobili: <2 mg/dL   Blood: x / Protein: Trace / Nitrite: Negative   Leuk Esterase: Negative / RBC: x / WBC x   Sq Epi: x / Non Sq Epi: x / Bacteria: x        MEDICATIONS  (STANDING):  allopurinol 100 milliGRAM(s) Oral at bedtime  amLODIPine   Tablet 5 milliGRAM(s) Oral at bedtime  brimonidine 0.2% Ophthalmic Solution 1 Drop(s) Both EYES three times a day  enoxaparin Injectable 40 milliGRAM(s) SubCutaneous daily  methimazole 5 milliGRAM(s) Oral <User Schedule>  metoprolol succinate ER 25 milliGRAM(s) Oral daily  pentoxifylline 400 milliGRAM(s) Oral two times a day  spironolactone 25 milliGRAM(s) Oral <User Schedule>    MEDICATIONS  (PRN):      Care Discussed with Consultants/Other Providers [ ] YES  [ ] NO
Hilario Fernandez MD  Interventional Cardiology / Advance Heart Failure and Cardiac Transplant Specialist  [place] Office : [address] [place] [address] [place] [address]  Tel:   [place] Office : [address] [facility] [place] [address]  Tel: [phone]  Cell : [phone]    HISTORY OF PRESENTING ILLNESS:  89yo F pmhx HTN, HLD, hyperthyroidism (on methimazole), gout, PVD?, peripheral neuropathy pw with episodes of dizziness/lightheadedness. Pt reports long history of dizziness that occurs when standing. Denies room spinning, instead reports a sensation of lightheadedness. Pt states episodes do not occur when sitting/laying down. Episodes usually resolve after a few minutes of sitting. Denies associated chest pain, blackening of vision, palpitations or SOB. Denies fevers, chills, or focal weakness. Ambulates with a cane. Reports baseline neuropathy in toes. Pt also reporting acute onset of right shoulder pain that started after moving plants. Denies any acute trauma. Pain currently resolved without intervention. Of note, pt had an TTE in january with dr keith eng that was reportedly normal. Patient admitted and noted to be orthostatic positive. Dizziness resolved today.  	  MEDICATIONS:  amLODIPine   Tablet 5 milliGRAM(s) Oral at bedtime  enoxaparin Injectable 40 milliGRAM(s) SubCutaneous daily  metoprolol succinate ER 25 milliGRAM(s) Oral daily  pentoxifylline 400 milliGRAM(s) Oral two times a day            allopurinol 100 milliGRAM(s) Oral at bedtime  methimazole 5 milliGRAM(s) Oral <User Schedule>    brimonidine 0.2% Ophthalmic Solution 1 Drop(s) Both EYES three times a day      PAST MEDICAL/SURGICAL HISTORY  PAST MEDICAL & SURGICAL HISTORY:  HTN (hypertension)    Osteoporosis    Pulmonary embolism    Gout    Hyperthyroidism    S/P appendectomy        SOCIAL HISTORY: Substance Use (street drugs): ( x ) never used  (  ) other:    FAMILY HISTORY:  No pertinent family history in first degree relatives        REVIEW OF SYSTEMS:  CONSTITUTIONAL: No fever, weight loss, or fatigue  EYES: No eye pain, visual disturbances, or discharge  ENMT:  No difficulty hearing, tinnitus, vertigo; No sinus or throat pain  BREASTS: No pain, masses, or nipple discharge  GASTROINTESTINAL: No abdominal or epigastric pain. No nausea, vomiting, or hematemesis; No diarrhea or constipation. No melena or hematochezia.  GENITOURINARY: No dysuria, frequency, hematuria, or incontinence  NEUROLOGICAL: No headaches, memory loss, loss of strength, numbness, or tremors  ENDOCRINE: No heat or cold intolerance; No hair loss  MUSCULOSKELETAL: No joint pain or swelling; No muscle, back, or extremity pain  PSYCHIATRIC: No depression, anxiety, mood swings, or difficulty sleeping  HEME/LYMPH: No easy bruising, or bleeding gums  All others negative    PHYSICAL EXAM:  T(C): 36.2 (06-28-21 @ 12:39), Max: 36.7 (06-27-21 @ 21:23)  HR: 80 (06-28-21 @ 12:39) (71 - 80)  BP: 120/82 (06-28-21 @ 12:39) (120/82 - 130/88)  RR: 18 (06-28-21 @ 12:39) (17 - 18)  SpO2: 100% (06-28-21 @ 12:39) (99% - 100%)  Wt(kg): --  I&O's Summary        GENERAL: NAD  EYES: EOMI, PERRLA, conjunctiva and sclera clear  ENMT: No tonsillar erythema, exudates, or enlargement  Cardiovascular: Normal S1 S2, No JVD, No murmurs, No edema  Respiratory: Lungs clear to auscultation	  Gastrointestinal:  Soft, Non-tender, + BS	  Extremities: No edema                                        13.8   5.77  )-----------( 123      ( 28 Jun 2021 06:49 )             40.9     06-28    138  |  106  |  23  ----------------------------<  84  4.0   |  20<L>  |  1.05    Ca    10.2      28 Jun 2021 06:49  Phos  2.8     06-27      proBNP:   Lipid Profile:   HgA1c:   TSH:     Consultant(s) Notes Reviewed:  [x ] YES  [ ] NO    Care Discussed with Consultants/Other Providers [ x] YES  [ ] NO    Imaging Personally Reviewed independently:  [x] YES  [ ] NO    All labs, radiologic studies, vitals, orders and medications list reviewed. Patient is seen and examined at bedside. Case discussed with medical team.

## 2021-06-28 NOTE — DISCHARGE NOTE NURSING/CASE MANAGEMENT/SOCIAL WORK - PATIENT PORTAL LINK FT
You can access the FollowMyHealth Patient Portal offered by [facility] by registering at the following website: [email or web address]. By joining [facility]’s FollowMyHealth portal, you will also be able to view your health information using other applications (apps) compatible with our system.

## 2021-06-28 NOTE — CONSULT NOTE ADULT - ASSESSMENT
COVERING DR. BURT    Assessment and Plan    89yo F pmhx HTN, HLD, hyperthyroidism (on methimazole), gout, PVD?, peripheral neuropathy pw with episodes of dizziness/lightheadedness. Pt reports long history of dizziness that occurs when standing    EKG: NSR no STT changes    1. Dizziness  -secondary to orthostatic hypotension  -resolved today, orthostatics now negative  -continue to hold aldatone  -echo pending--expedited    2. HTN  -controlled  -c/w metoprolol and norvasc  -continue to monitor BP    3. DVT prophylaxis  -lovenox subq

## 2021-06-29 ENCOUNTER — TRANSCRIPTION ENCOUNTER (OUTPATIENT)
Age: 86
End: 2021-06-29

## 2021-06-29 VITALS
SYSTOLIC BLOOD PRESSURE: 139 MMHG | HEART RATE: 73 BPM | DIASTOLIC BLOOD PRESSURE: 88 MMHG | TEMPERATURE: 99 F | RESPIRATION RATE: 18 BRPM | OXYGEN SATURATION: 100 %

## 2021-06-29 LAB
ANION GAP SERPL CALC-SCNC: 11 MMOL/L — SIGNIFICANT CHANGE UP (ref 7–14)
BUN SERPL-MCNC: 22 MG/DL — SIGNIFICANT CHANGE UP (ref 7–23)
CALCIUM SERPL-MCNC: 9.5 MG/DL — SIGNIFICANT CHANGE UP (ref 8.4–10.5)
CHLORIDE SERPL-SCNC: 106 MMOL/L — SIGNIFICANT CHANGE UP (ref 98–107)
CO2 SERPL-SCNC: 22 MMOL/L — SIGNIFICANT CHANGE UP (ref 22–31)
CREAT SERPL-MCNC: 0.97 MG/DL — SIGNIFICANT CHANGE UP (ref 0.5–1.3)
GLUCOSE SERPL-MCNC: 92 MG/DL — SIGNIFICANT CHANGE UP (ref 70–99)
HCT VFR BLD CALC: 37.4 % — SIGNIFICANT CHANGE UP (ref 34.5–45)
HGB BLD-MCNC: 12.4 G/DL — SIGNIFICANT CHANGE UP (ref 11.5–15.5)
MAGNESIUM SERPL-MCNC: 2.1 MG/DL — SIGNIFICANT CHANGE UP (ref 1.6–2.6)
MCHC RBC-ENTMCNC: 32.2 PG — SIGNIFICANT CHANGE UP (ref 27–34)
MCHC RBC-ENTMCNC: 33.2 GM/DL — SIGNIFICANT CHANGE UP (ref 32–36)
MCV RBC AUTO: 97.1 FL — SIGNIFICANT CHANGE UP (ref 80–100)
NRBC # BLD: 0 /100 WBCS — SIGNIFICANT CHANGE UP
NRBC # FLD: 0 K/UL — SIGNIFICANT CHANGE UP
PHOSPHATE SERPL-MCNC: 3.2 MG/DL — SIGNIFICANT CHANGE UP (ref 2.5–4.5)
PLATELET # BLD AUTO: 103 K/UL — LOW (ref 150–400)
POTASSIUM SERPL-MCNC: 3.9 MMOL/L — SIGNIFICANT CHANGE UP (ref 3.5–5.3)
POTASSIUM SERPL-SCNC: 3.9 MMOL/L — SIGNIFICANT CHANGE UP (ref 3.5–5.3)
RBC # BLD: 3.85 M/UL — SIGNIFICANT CHANGE UP (ref 3.8–5.2)
RBC # FLD: 13 % — SIGNIFICANT CHANGE UP (ref 10.3–14.5)
SODIUM SERPL-SCNC: 139 MMOL/L — SIGNIFICANT CHANGE UP (ref 135–145)
WBC # BLD: 4.09 K/UL — SIGNIFICANT CHANGE UP (ref 3.8–10.5)
WBC # FLD AUTO: 4.09 K/UL — SIGNIFICANT CHANGE UP (ref 3.8–10.5)

## 2021-06-29 RX ORDER — SPIRONOLACTONE 25 MG/1
1 TABLET, FILM COATED ORAL
Qty: 0 | Refills: 0 | DISCHARGE

## 2021-06-29 RX ORDER — EZETIMIBE 10 MG/1
1 TABLET ORAL
Qty: 0 | Refills: 0 | DISCHARGE

## 2021-06-29 RX ORDER — AMLODIPINE BESYLATE 2.5 MG/1
1 TABLET ORAL
Qty: 0 | Refills: 0 | DISCHARGE

## 2021-06-29 RX ORDER — AMLODIPINE BESYLATE 2.5 MG/1
1 TABLET ORAL
Qty: 30 | Refills: 0
Start: 2021-06-29 | End: 2021-07-28

## 2021-06-29 RX ADMIN — BRIMONIDINE TARTRATE 1 DROP(S): 2 SOLUTION/ DROPS OPHTHALMIC at 05:30

## 2021-06-29 RX ADMIN — Medication 400 MILLIGRAM(S): at 05:30

## 2021-06-29 RX ADMIN — BRIMONIDINE TARTRATE 1 DROP(S): 2 SOLUTION/ DROPS OPHTHALMIC at 13:02

## 2021-06-29 RX ADMIN — Medication 25 MILLIGRAM(S): at 05:30

## 2021-06-29 NOTE — DISCHARGE NOTE PROVIDER - NSDCMRMEDTOKEN_GEN_ALL_CORE_FT
allopurinol 100 mg oral tablet: 1 tab(s) orally once a day except Thursdays  amLODIPine 5 mg oral tablet: 1 tab(s) orally once a day (at bedtime)  brimonidine 0.2% ophthalmic solution: 1 drop(s) to each affected eye 3 times a day  methimazole 5 mg oral tablet: 1 tab(s) orally Monday, Wednesday, and Friday  Metoprolol Succinate ER 25 mg oral tablet, extended release: 1 tab(s) orally once a day  pentoxifylline 400 mg oral tablet, extended release: 1 tab(s) orally 2 times a day  Rollator : 1 tab(s) orally once a day

## 2021-06-29 NOTE — DISCHARGE NOTE PROVIDER - HOSPITAL COURSE
89 YO F pmhx HTN, HLD, hyperthyroidism (on methimazole), gout, PVD, peripheral neuropathy pw with episodes of dizziness/lightheadedness likely 2/2 postural hypotension in the setting of polypharmacy     Lightheadedness   Orthostatic negative   Improved     Shoulder pain   Likely MSK strain. Full ROM without pain at present.   XRAY of the shoulder normal     Hypercalcemia   Improved     Hyperthyroidism   Continue with methimazole  Follow up with Endocrinology outpatient within 1-2 weeks     HTN (hypertension)   Low sodium and fat diet, continue anti-hypertensive medications, and follow up with primary care physician.  6/28: TTE:   TTE: CONCLUSIONS:  1. Mitral annular calcification, otherwise normal mitral  valve. Mild mitral regurgitation.  2. Normal left ventricular internal dimensions and wall  thicknesses.  3. Endocardium not well visualized; grossly normal left  ventricular systolic function.  4. Mild diastolic dysfunction (Stage I).  5. Normal right ventricular size and function.    Thrombocytopenia  mild MDS  No signs of bleeding    6/29: Case discussed with Dr. Hatch. Patient is stable for discharge. Medications reviewed and sent tot agreed upon pharmacy

## 2021-06-29 NOTE — DISCHARGE NOTE PROVIDER - CARE PROVIDER_API CALL
Hilario Fernandez  CARDIOVASCULAR DISEASE  [address] [place] [address]  Phone: [phone]  Fax: [phone]  Follow Up Time:     Freddie Croft (DO)  Cardiovascular Disease; Internal Medicine; Nuclear Cardiology  [address] [facility], [address] [address]  Phone: [phone]  Fax: [phone]  Follow Up Time:

## 2021-06-29 NOTE — DISCHARGE NOTE PROVIDER - NSFOLLOWUPCLINICS_GEN_ALL_ED_FT
[facility]  Endocrinology  [address] [place] [address]  Phone: [phone]  Fax:

## 2021-06-29 NOTE — PROGRESS NOTE ADULT - REASON FOR ADMISSION
Please review BG log  
lightheadedness

## 2021-06-29 NOTE — PROGRESS NOTE ADULT - ASSESSMENT
89yo F pmhx HTN, HLD, hyperthyroidism (on methimazole), gout, PVD?, peripheral neuropathy pw with episodes of dizziness/lightheadedness likely 2/2 postural hypotension in the setting of polypharmacy   Nephrology consulted for hypercalcemia       Hypercalcemia  PTH elevated , Vitamin D not high  possible primary hyperparathyroidism. can be follow up with outpatient endo  Improving serum calcium with IVF  Monitor serum calcium     HTN  BP stable  Monitor BP    Proteinuria  Repeat UA  Monitor at present   
COVERING DR. CROFT    Assessment and Plan    89yo F pmhx HTN, HLD, hyperthyroidism (on methimazole), gout, PVD?, peripheral neuropathy pw with episodes of dizziness/lightheadedness. Pt reports long history of dizziness that occurs when standing    EKG: NSR no STT changes    1. Dizziness  -secondary to orthostatic hypotension  -resolved today, orthostatics now negative  -continue to hold aldatone on discharge and follow up with Dr. Croft  -echo with grossly normal LV function and mild diastolic dysfunctin     2. HTN  -controlled  -c/w metoprolol and norvasc  -continue to monitor BP    3. DVT prophylaxis  -lovenox subq
89yo F pmhx HTN, HLD, hyperthyroidism (on methimazole), gout, PVD?, peripheral neuropathy pw with episodes of dizziness/lightheadedness likely 2/2 postural hypotension in the setting of polypharmacy     # Lightheadedness.   improved   hold diuretics   -TTE to further evaluate  -monitor on telemetry  -PT eval.     # Shoulder pain.    -likely MSK strain. Full ROM without pain at present. Xray of the shoulder wnl   -pain control: tylenol  -PT eval.     Hypercalcemia   -seen by renal   improved with Iv fluids     # Hyperthyroidism.   -c/w methimazole  -TSH pending.     HTN (hypertension).    -c/w metoprolol, reduce amlodipine  -monitor BP.     Thrombocytopenia.    -perhaps 2/2 mild MDS  -no signs of bleeding.   -stable   
89yo F pmhx HTN, HLD, hyperthyroidism (on methimazole), gout, PVD?, peripheral neuropathy pw with episodes of dizziness/lightheadedness likely 2/2 postural hypotension in the setting of polypharmacy     # Lightheadedness.   improved   hold diuretics   -TTE to further evaluate  -monitor on telemetry  -PT eval.     # Shoulder pain.    -likely MSK strain. Full ROM without pain at present. Xray of the shoulder wnl   -pain control: tylenol  -PT eval.     Hypercalcemia   -seen by renal   improved with Iv fluids     # Hyperthyroidism.   -c/w methimazole  -TSH pending.     HTN (hypertension).    -c/w metoprolol, reduce amlodipine  -monitor BP.     Thrombocytopenia.    -perhaps 2/2 mild MDS  -no signs of bleeding.   -stable     dispo: d/c planning . d/c diuretics on home meds on d/c   
89yo F pmhx HTN, HLD, hyperthyroidism (on methimazole), gout, PVD?, peripheral neuropathy pw with episodes of dizziness/lightheadedness likely 2/2 postural hypotension in the setting of polypharmacy   Nephrology consulted for hypercalcemia       Hypercalcemia  PTH elevated , Follow up  Vitamin D   Improving serum calcium with IVF  Monitor serum calcium     HTN  BP stable  Monitor BP    Proteinuria  Repeat UA  Monitor at present   
89yo F pmhx HTN, HLD, hyperthyroidism (on methimazole), gout, PVD?, peripheral neuropathy pw with episodes of dizziness/lightheadedness likely 2/2 postural hypotension in the setting of polypharmacy   Nephrology consulted for hypercalcemia       Hypercalcemia  PTH elevated , Vitamin D not high  possible primary hyperparathyroidism. can be follow up with outpatient endo  Improving serum calcium with IVF  Monitor serum calcium     HTN  BP stable  Monitor BP    Proteinuria  Repeat UA  Monitor at present

## 2021-06-29 NOTE — DISCHARGE NOTE PROVIDER - NSDCCPCAREPLAN_GEN_ALL_CORE_FT
PRINCIPAL DISCHARGE DIAGNOSIS  Diagnosis: Pre-syncope  Assessment and Plan of Treatment: Follow up with Primary Care Provider within 1-2 weeks         SECONDARY DISCHARGE DIAGNOSES  Diagnosis: Hyperthyroidism  Assessment and Plan of Treatment: Continue with methimazole  Follow up with Endocrinology outpatient within 1-2 weeks    Diagnosis: HTN (hypertension)  Assessment and Plan of Treatment: Low sodium and fat diet, continue anti-hypertensive medications, and follow up with primary care physician.      Diagnosis: Thrombocytopenia  Assessment and Plan of Treatment: No signs of bleeding    Diagnosis: Lightheadedness  Assessment and Plan of Treatment: Orthostatic negative

## 2021-06-29 NOTE — PROGRESS NOTE ADULT - SUBJECTIVE AND OBJECTIVE BOX
Patient is a 89y old  Female who presents with a chief complaint of lightheadedness (28 Jun 2021 14:48)      INTERVAL HPI/OVERNIGHT EVENTS: seen and examined, NAD  T(C): 36.7 (06-28-21 @ 21:37), Max: 36.7 (06-28-21 @ 21:37)  HR: 65 (06-28-21 @ 21:37) (65 - 80)  BP: 111/69 (06-28-21 @ 21:37) (111/69 - 125/76)  RR: 17 (06-28-21 @ 21:37) (17 - 18)  SpO2: 98% (06-28-21 @ 21:37) (98% - 100%)  Wt(kg): --  I&O's Summary      PAST MEDICAL & SURGICAL HISTORY:  HTN (hypertension)    Osteoporosis    Pulmonary embolism    Gout    Hyperthyroidism    S/P appendectomy        SOCIAL HISTORY  Alcohol:  Tobacco:  Illicit substance use:    FAMILY HISTORY:    REVIEW OF SYSTEMS:  CONSTITUTIONAL: No fever, weight loss, or fatigue  EYES: No eye pain, visual disturbances, or discharge  ENMT:  No difficulty hearing, tinnitus, vertigo; No sinus or throat pain  NECK: No pain or stiffness  RESPIRATORY: No cough, wheezing, chills or hemoptysis; No shortness of breath  CARDIOVASCULAR: No chest pain, palpitations, dizziness, or leg swelling  GASTROINTESTINAL: No abdominal or epigastric pain. No nausea, vomiting, or hematemesis; No diarrhea or constipation. No melena or hematochezia.  GENITOURINARY: No dysuria, frequency, hematuria, or incontinence  NEUROLOGICAL: No headaches, memory loss, loss of strength, numbness, or tremors  SKIN: No itching, burning, rashes, or lesions   LYMPH NODES: No enlarged glands  ENDOCRINE: No heat or cold intolerance; No hair loss  MUSCULOSKELETAL: No joint pain or swelling; No muscle, back, or extremity pain  PSYCHIATRIC: No depression, anxiety, mood swings, or difficulty sleeping  HEME/LYMPH: No easy bruising, or bleeding gums  ALLERY AND IMMUNOLOGIC: No hives or eczema    RADIOLOGY & ADDITIONAL TESTS:    Imaging Personally Reviewed:  [ ] YES  [ ] NO    Consultant(s) Notes Reviewed:  [ ] YES  [ ] NO    PHYSICAL EXAM:  GENERAL: NAD, well-groomed, well-developed  HEAD:  Atraumatic, Normocephalic  EYES: EOMI, PERRLA, conjunctiva and sclera clear  ENMT: No tonsillar erythema, exudates, or enlargement; Moist mucous membranes, Good dentition, No lesions  NECK: Supple, No JVD, Normal thyroid  NERVOUS SYSTEM:  Alert & Oriented X3, Good concentration; Motor Strength 5/5 B/L upper and lower extremities; DTRs 2+ intact and symmetric  CHEST/LUNG: Clear to percussion bilaterally; No rales, rhonchi, wheezing, or rubs  HEART: Regular rate and rhythm; No murmurs, rubs, or gallops  ABDOMEN: Soft, Nontender, Nondistended; Bowel sounds present  EXTREMITIES:  2+ Peripheral Pulses, No clubbing, cyanosis, or edema  LYMPH: No lymphadenopathy noted  SKIN: No rashes or lesions    LABS:                        13.8   5.77  )-----------( 123      ( 28 Jun 2021 06:49 )             40.9     06-28    138  |  106  |  23  ----------------------------<  84  4.0   |  20<L>  |  1.05    Ca    10.2      28 Jun 2021 06:49  Phos  2.8     06-27          CAPILLARY BLOOD GLUCOSE                MEDICATIONS  (STANDING):  allopurinol 100 milliGRAM(s) Oral at bedtime  amLODIPine   Tablet 5 milliGRAM(s) Oral at bedtime  brimonidine 0.2% Ophthalmic Solution 1 Drop(s) Both EYES three times a day  enoxaparin Injectable 40 milliGRAM(s) SubCutaneous daily  methimazole 5 milliGRAM(s) Oral <User Schedule>  metoprolol succinate ER 25 milliGRAM(s) Oral daily  pentoxifylline 400 milliGRAM(s) Oral two times a day    MEDICATIONS  (PRN):      Care Discussed with Consultants/Other Providers [ ] YES  [ ] NO
[facility] [place] [facility]   MD JANI DASILVA MD RUORU WONG, PA    TEL:  OFFICE: [phone]  DR LOWERY CELL: [phone]  ALEXIS HAINES CELL: [phone]  DR. GILL CELL: [phone]  DR. PINO CELL: [phone]    FROM 5 PM - 7 AM PLEASE CALL ANSWERING SERVICE: [phone]    RENAL FOLLOW UP NOTE--Date of Service 06-27-21 @ 10:22  --------------------------------------------------------------------------------  HPI:      Pt seen and examined at bedside.   Denies SOB, chest pain   sitting up in chair    PAST HISTORY  --------------------------------------------------------------------------------  No significant changes to PMH, PSH, FHx, SHx, unless otherwise noted    ALLERGIES & MEDICATIONS  --------------------------------------------------------------------------------  Allergies    No Known Allergies    Intolerances      Standing Inpatient Medications  allopurinol 100 milliGRAM(s) Oral at bedtime  amLODIPine   Tablet 5 milliGRAM(s) Oral at bedtime  brimonidine 0.2% Ophthalmic Solution 1 Drop(s) Both EYES three times a day  enoxaparin Injectable 40 milliGRAM(s) SubCutaneous daily  methimazole 5 milliGRAM(s) Oral <User Schedule>  metoprolol succinate ER 25 milliGRAM(s) Oral daily  pentoxifylline 400 milliGRAM(s) Oral two times a day  spironolactone 25 milliGRAM(s) Oral <User Schedule>    PRN Inpatient Medications      REVIEW OF SYSTEMS  --------------------------------------------------------------------------------  General: no fever  CVS: no chest pain  MSK: no edema     VITALS/PHYSICAL EXAM  --------------------------------------------------------------------------------  T(C): 36.7 (06-27-21 @ 05:06), Max: 37.1 (06-26-21 @ 21:06)  HR: 64 (06-27-21 @ 05:06) (64 - 72)  BP: 137/65 (06-27-21 @ 05:06) (116/72 - 137/65)  RR: 17 (06-27-21 @ 05:06) (17 - 18)  SpO2: 100% (06-27-21 @ 05:06) (99% - 100%)  Wt(kg): --  Height (cm): 154.9 (06-25-21 @ 17:15)  Weight (kg): 58.6 (06-26-21 @ 06:00)  BMI (kg/m2): 24.4 (06-26-21 @ 06:00)  BSA (m2): 1.57 (06-26-21 @ 06:00)      Physical Exam:  	Gen: NAD  	HEENT: MMM  	Pulm: CTA B/L  	CV: S1S2  	Abd: Soft, +BS  	Ext: No LE edema B/L                      Neuro: Awake   	Skin: Warm and Dry   	Vascular access: NO HD catheter            no  osman  LABS/STUDIES  --------------------------------------------------------------------------------              13.2   4.43  >-----------<  123      [06-27-21 @ 06:39]              39.5     138  |  104  |  23  ----------------------------<  101      [06-27-21 @ 06:39]  3.9   |  20  |  1.04        Ca     9.8     [06-27-21 @ 06:39]      Mg     2.1     [06-26-21 @ 10:54]      Phos  2.8     [06-27-21 @ 06:39]    TPro  8.3  /  Alb  4.7  /  TBili  0.4  /  DBili  x   /  AST  19  /  ALT  20  /  AlkPhos  77  [06-25-21 @ 20:28]          Creatinine Trend:  SCr 1.04 [06-27 @ 06:39]  SCr 0.96 [06-26 @ 10:54]  SCr 1.00 [06-25 @ 20:28]    Urinalysis - [06-25-21 @ 21:31]      Color Colorless / Appearance Clear / SG 1.008 / pH 7.5      Gluc Negative / Ketone Negative  / Bili Negative / Urobili <2 mg/dL       Blood Negative / Protein Trace / Leuk Est Negative / Nitrite Negative      RBC  / WBC  / Hyaline  / Gran  / Sq Epi  / Non Sq Epi  / Bacteria       PTH -- (Ca --)      [06-27-21 @ 06:39]   102  TSH 2.69      [06-26-21 @ 10:54]  Lipid: chol 193, TG 65, HDL 64, LDL --      [06-27-21 @ 06:39]      
Patient is a 89y old  Female who presents with a chief complaint of lightheadedness (2021 10:22)      INTERVAL HPI/OVERNIGHT EVENTS: doing fair , denies any c/o    T(C): 36.9 (21 @ 13:16), Max: 37.1 (21 @ 21:06)  HR: 69 (21 @ 13:16) (64 - 71)  BP: 137/83 (21 @ 13:16) (116/72 - 137/83)  RR: 17 (21 @ 13:16) (17 - 17)  SpO2: 99% (21 @ 13:16) (99% - 100%)  Wt(kg): --  I&O's Summary      PAST MEDICAL & SURGICAL HISTORY:  HTN (hypertension)    Osteoporosis    Pulmonary embolism    Gout    Hyperthyroidism    S/P appendectomy        SOCIAL HISTORY  Alcohol:  Tobacco:  Illicit substance use:    FAMILY HISTORY:    REVIEW OF SYSTEMS:  CONSTITUTIONAL: No fever, weight loss, or fatigue  EYES: No eye pain, visual disturbances, or discharge  ENMT:  No difficulty hearing, tinnitus, vertigo; No sinus or throat pain  NECK: No pain or stiffness  RESPIRATORY: No cough, wheezing, chills or hemoptysis; No shortness of breath  CARDIOVASCULAR: No chest pain, palpitations, dizziness, or leg swelling  GASTROINTESTINAL: No abdominal or epigastric pain. No nausea, vomiting, or hematemesis; No diarrhea or constipation. No melena or hematochezia.  GENITOURINARY: No dysuria, frequency, hematuria, or incontinence  NEUROLOGICAL: No headaches, memory loss, loss of strength, numbness, or tremors  SKIN: No itching, burning, rashes, or lesions   LYMPH NODES: No enlarged glands  ENDOCRINE: No heat or cold intolerance; No hair loss  MUSCULOSKELETAL: No joint pain or swelling; No muscle, back, or extremity pain  PSYCHIATRIC: No depression, anxiety, mood swings, or difficulty sleeping  HEME/LYMPH: No easy bruising, or bleeding gums  ALLERY AND IMMUNOLOGIC: No hives or eczema    RADIOLOGY & ADDITIONAL TESTS:    Imaging Personally Reviewed:  [ ] YES  [ ] NO    Consultant(s) Notes Reviewed:  [ ] YES  [ ] NO    PHYSICAL EXAM:  GENERAL: NAD, well-groomed, well-developed  HEAD:  Atraumatic, Normocephalic  EYES: EOMI, PERRLA, conjunctiva and sclera clear  ENMT: No tonsillar erythema, exudates, or enlargement; Moist mucous membranes, Good dentition, No lesions  NECK: Supple, No JVD, Normal thyroid  NERVOUS SYSTEM:  Alert & Oriented X3, Good concentration; Motor Strength 5/5 B/L upper and lower extremities; DTRs 2+ intact and symmetric  CHEST/LUNG: Clear to percussion bilaterally; No rales, rhonchi, wheezing, or rubs  HEART: Regular rate and rhythm; No murmurs, rubs, or gallops  ABDOMEN: Soft, Nontender, Nondistended; Bowel sounds present  EXTREMITIES:  2+ Peripheral Pulses, No clubbing, cyanosis, or edema  LYMPH: No lymphadenopathy noted  SKIN: No rashes or lesions    LABS:                        13.2   4.43  )-----------( 123      ( 2021 06:39 )             39.5     -    138  |  104  |  23  ----------------------------<  101<H>  3.9   |  20<L>  |  1.04    Ca    9.8      2021 06:39  Phos  2.8       Mg     2.1     -    TPro  8.3  /  Alb  4.7  /  TBili  0.4  /  DBili  x   /  AST  19  /  ALT  20  /  AlkPhos  77  06-      Urinalysis Basic - ( 2021 21:31 )    Color: Colorless / Appearance: Clear / S.008 / pH: x  Gluc: x / Ketone: Negative  / Bili: Negative / Urobili: <2 mg/dL   Blood: x / Protein: Trace / Nitrite: Negative   Leuk Esterase: Negative / RBC: x / WBC x   Sq Epi: x / Non Sq Epi: x / Bacteria: x      CAPILLARY BLOOD GLUCOSE            Urinalysis Basic - ( 2021 21:31 )    Color: Colorless / Appearance: Clear / S.008 / pH: x  Gluc: x / Ketone: Negative  / Bili: Negative / Urobili: <2 mg/dL   Blood: x / Protein: Trace / Nitrite: Negative   Leuk Esterase: Negative / RBC: x / WBC x   Sq Epi: x / Non Sq Epi: x / Bacteria: x        MEDICATIONS  (STANDING):  allopurinol 100 milliGRAM(s) Oral at bedtime  amLODIPine   Tablet 5 milliGRAM(s) Oral at bedtime  brimonidine 0.2% Ophthalmic Solution 1 Drop(s) Both EYES three times a day  enoxaparin Injectable 40 milliGRAM(s) SubCutaneous daily  methimazole 5 milliGRAM(s) Oral <User Schedule>  metoprolol succinate ER 25 milliGRAM(s) Oral daily  pentoxifylline 400 milliGRAM(s) Oral two times a day    MEDICATIONS  (PRN):      Care Discussed with Consultants/Other Providers [ ] YES  [ ] NO
[facility] [place] [facility]   MD JANI DASILVA MD RUORU WONG, PA    TEL:  OFFICE: [phone]  DR LOWERY CELL: [phone]  ALEXIS HAINES CELL: [phone]  DR. GILL CELL: [phone]  DR. PINO CELL: [phone]    FROM 5 PM - 7 AM PLEASE CALL ANSWERING SERVICE: [phone]    RENAL FOLLOW UP NOTE--Date of Service 06-29-21 @ 07:54  --------------------------------------------------------------------------------  HPI:      Pt seen and examined at bedside.       PAST HISTORY  --------------------------------------------------------------------------------  No significant changes to PMH, PSH, FHx, SHx, unless otherwise noted    ALLERGIES & MEDICATIONS  --------------------------------------------------------------------------------  Allergies    No Known Allergies    Intolerances      Standing Inpatient Medications  allopurinol 100 milliGRAM(s) Oral at bedtime  amLODIPine   Tablet 5 milliGRAM(s) Oral at bedtime  brimonidine 0.2% Ophthalmic Solution 1 Drop(s) Both EYES three times a day  enoxaparin Injectable 40 milliGRAM(s) SubCutaneous daily  methimazole 5 milliGRAM(s) Oral <User Schedule>  metoprolol succinate ER 25 milliGRAM(s) Oral daily  pentoxifylline 400 milliGRAM(s) Oral two times a day    PRN Inpatient Medications      REVIEW OF SYSTEMS  --------------------------------------------------------------------------------  General: no fever  MSK: no edema     VITALS/PHYSICAL EXAM  --------------------------------------------------------------------------------  T(C): 36.6 (06-29-21 @ 05:22), Max: 36.7 (06-28-21 @ 21:37)  HR: 66 (06-29-21 @ 05:22) (65 - 80)  BP: 132/77 (06-29-21 @ 05:22) (111/69 - 132/77)  RR: 18 (06-29-21 @ 05:22) (17 - 18)  SpO2: 100% (06-29-21 @ 05:22) (98% - 100%)  Wt(kg): --        Physical Exam:  	Gen: NAD  	HEENT: MMM  	Pulm: CTA B/L  	CV: S1S2  	Abd: Soft, +BS  	Ext: No LE edema B/L                      Neuro: Awake   	Skin: Warm and Dry   	Vascular access: NO HD catheter             no dawson  LABS/STUDIES  --------------------------------------------------------------------------------              12.4   4.09  >-----------<  103      [06-29-21 @ 06:20]              37.4     139  |  106  |  22  ----------------------------<  92      [06-29-21 @ 06:20]  3.9   |  22  |  0.97        Ca     9.5     [06-29-21 @ 06:20]      Mg     2.10     [06-29-21 @ 06:20]      Phos  3.2     [06-29-21 @ 06:20]            Creatinine Trend:  SCr 0.97 [06-29 @ 06:20]  SCr 1.05 [06-28 @ 06:49]  SCr 1.04 [06-27 @ 06:39]  SCr 0.96 [06-26 @ 10:54]  SCr 1.00 [06-25 @ 20:28]    Urinalysis - [06-25-21 @ 21:31]      Color Colorless / Appearance Clear / SG 1.008 / pH 7.5      Gluc Negative / Ketone Negative  / Bili Negative / Urobili <2 mg/dL       Blood Negative / Protein Trace / Leuk Est Negative / Nitrite Negative      RBC  / WBC  / Hyaline  / Gran  / Sq Epi  / Non Sq Epi  / Bacteria       PTH -- (Ca --)      [06-27-21 @ 06:39]   102  Vitamin D (25OH) 32.0      [06-27-21 @ 09:40]  TSH 2.69      [06-26-21 @ 10:54]  Lipid: chol 193, TG 65, HDL 64, LDL --      [06-27-21 @ 06:39]      
[facility] [place] [facility]   MD JEANNE DASILVA DO ANAM SIDDIQUI ANGELA WONG, PA    TEL:  OFFICE: [phone]  DR LOWERY CELL: [phone]  DR. PINO CELL: [phone]  DR. GILL CELL: [phone]  AAMIR HAINES CELL: [phone]    From 5pm-7am Answering Service [phone]    -- RENAL FOLLOW UP NOTE ---Date of Service 06-28-21 @ 13:44    Patient is a 89y old  Female who presents with a chief complaint of lightheadedness (27 Jun 2021 14:24)      Patient seen and examined at bedside. No chest pain/sob    VITALS:  T(F): 97.2 (06-28-21 @ 12:39), Max: 98.1 (06-27-21 @ 21:23)  HR: 80 (06-28-21 @ 12:39)  BP: 120/82 (06-28-21 @ 12:39)  RR: 18 (06-28-21 @ 12:39)  SpO2: 100% (06-28-21 @ 12:39)  Wt(kg): --        PHYSICAL EXAM:  Constitutional: NAD  Neck: No JVD  Respiratory: CTAB, no wheezes, rales or rhonchi  Cardiovascular: S1, S2, RRR  Gastrointestinal: BS+, soft, NT/ND  Extremities: No peripheral edema    Hospital Medications:   MEDICATIONS  (STANDING):  allopurinol 100 milliGRAM(s) Oral at bedtime  amLODIPine   Tablet 5 milliGRAM(s) Oral at bedtime  brimonidine 0.2% Ophthalmic Solution 1 Drop(s) Both EYES three times a day  enoxaparin Injectable 40 milliGRAM(s) SubCutaneous daily  methimazole 5 milliGRAM(s) Oral <User Schedule>  metoprolol succinate ER 25 milliGRAM(s) Oral daily  pentoxifylline 400 milliGRAM(s) Oral two times a day      LABS:  06-28    138  |  106  |  23  ----------------------------<  84  4.0   |  20<L>  |  1.05    Ca    10.2      28 Jun 2021 06:49  Phos  2.8     06-27      Creatinine Trend: 1.05 <--, 1.04 <--, 0.96 <--, 1.00 <--                                13.8   5.77  )-----------( 123      ( 28 Jun 2021 06:49 )             40.9     Urine Studies:  Urinalysis - [06-25-21 @ 21:31]      Color Colorless / Appearance Clear / SG 1.008 / pH 7.5      Gluc Negative / Ketone Negative  / Bili Negative / Urobili <2 mg/dL       Blood Negative / Protein Trace / Leuk Est Negative / Nitrite Negative      RBC  / WBC  / Hyaline  / Gran  / Sq Epi  / Non Sq Epi  / Bacteria       PTH -- (Ca --)      [06-27-21 @ 06:39]   102  Vitamin D (25OH) 32.0      [06-27-21 @ 09:40]  TSH 2.69      [06-26-21 @ 10:54]  Lipid: chol 193, TG 65, HDL 64, LDL --      [06-27-21 @ 06:39]        RADIOLOGY & ADDITIONAL STUDIES:  
Hilario Fernandez MD  Interventional Cardiology / Advance Heart Failure and Cardiac Transplant Specialist  [place] Office : [address] [place] [address]. [address]  Tel:   [place] Office : [address] [facility] [address] [address]  Tel: [phone]  Cell : [phone]    Subjective/Overnight events: Pt is lying in bed comfortable not in distress, no chest pains no SOB no palpitations  	  MEDICATIONS:  amLODIPine   Tablet 5 milliGRAM(s) Oral at bedtime  enoxaparin Injectable 40 milliGRAM(s) SubCutaneous daily  metoprolol succinate ER 25 milliGRAM(s) Oral daily  pentoxifylline 400 milliGRAM(s) Oral two times a day            allopurinol 100 milliGRAM(s) Oral at bedtime  methimazole 5 milliGRAM(s) Oral <User Schedule>    brimonidine 0.2% Ophthalmic Solution 1 Drop(s) Both EYES three times a day      PAST MEDICAL/SURGICAL HISTORY  PAST MEDICAL & SURGICAL HISTORY:  HTN (hypertension)    Osteoporosis    Pulmonary embolism    Gout    Hyperthyroidism    S/P appendectomy        SOCIAL HISTORY: Substance Use (street drugs): ( x ) never used  (  ) other:    FAMILY HISTORY:  No pertinent family history in first degree relatives        REVIEW OF SYSTEMS:  CONSTITUTIONAL: No fever, weight loss, or fatigue  EYES: No eye pain, visual disturbances, or discharge  ENMT:  No difficulty hearing, tinnitus, vertigo; No sinus or throat pain  BREASTS: No pain, masses, or nipple discharge  GASTROINTESTINAL: No abdominal or epigastric pain. No nausea, vomiting, or hematemesis; No diarrhea or constipation. No melena or hematochezia.  GENITOURINARY: No dysuria, frequency, hematuria, or incontinence  NEUROLOGICAL: No headaches, memory loss, loss of strength, numbness, or tremors  ENDOCRINE: No heat or cold intolerance; No hair loss  MUSCULOSKELETAL: No joint pain or swelling; No muscle, back, or extremity pain  PSYCHIATRIC: No depression, anxiety, mood swings, or difficulty sleeping  HEME/LYMPH: No easy bruising, or bleeding gums  All others negative    PHYSICAL EXAM:  T(C): 36.6 (06-29-21 @ 05:22), Max: 36.7 (06-28-21 @ 21:37)  HR: 66 (06-29-21 @ 05:22) (65 - 80)  BP: 132/77 (06-29-21 @ 05:22) (111/69 - 132/77)  RR: 18 (06-29-21 @ 05:22) (17 - 18)  SpO2: 100% (06-29-21 @ 05:22) (98% - 100%)  Wt(kg): --  I&O's Summary        GENERAL: NAD  EYES: EOMI, PERRLA, conjunctiva and sclera clear  ENMT: No tonsillar erythema, exudates, or enlargement  Cardiovascular: Normal S1 S2, No JVD, No murmurs, No edema  Respiratory: Lungs clear to auscultation	  Gastrointestinal:  Soft, Non-tender, + BS	  Extremities: No edema                                        12.4   4.09  )-----------( 103      ( 29 Jun 2021 06:20 )             37.4     06-29    139  |  106  |  22  ----------------------------<  92  3.9   |  22  |  0.97    Ca    9.5      29 Jun 2021 06:20  Phos  3.2     06-29  Mg     2.10     06-29      proBNP:   Lipid Profile:   HgA1c:   TSH:     Consultant(s) Notes Reviewed:  [x ] YES  [ ] NO    Care Discussed with Consultants/Other Providers [ x] YES  [ ] NO    Imaging Personally Reviewed independently:  [x] YES  [ ] NO    All labs, radiologic studies, vitals, orders and medications list reviewed. Patient is seen and examined at bedside. Case discussed with medical team.

## 2023-01-05 ENCOUNTER — APPOINTMENT (OUTPATIENT)
Dept: VASCULAR SURGERY | Facility: CLINIC | Age: 88
End: 2023-01-05
Payer: MEDICARE

## 2023-01-05 VITALS
HEIGHT: 60 IN | DIASTOLIC BLOOD PRESSURE: 76 MMHG | TEMPERATURE: 97.6 F | WEIGHT: 117 LBS | SYSTOLIC BLOOD PRESSURE: 152 MMHG | HEART RATE: 73 BPM | BODY MASS INDEX: 22.97 KG/M2

## 2023-01-05 PROCEDURE — 99204 OFFICE O/P NEW MOD 45 MIN: CPT

## 2023-01-05 PROCEDURE — 93923 UPR/LXTR ART STDY 3+ LVLS: CPT

## 2023-05-04 ENCOUNTER — EMERGENCY (EMERGENCY)
Facility: HOSPITAL | Age: 88
LOS: 1 days | Discharge: HOME | End: 2023-05-04
Attending: EMERGENCY MEDICINE
Payer: MEDICARE

## 2023-05-04 VITALS
TEMPERATURE: 98 F | DIASTOLIC BLOOD PRESSURE: 71 MMHG | HEART RATE: 70 BPM | RESPIRATION RATE: 18 BRPM | SYSTOLIC BLOOD PRESSURE: 159 MMHG | OXYGEN SATURATION: 100 %

## 2023-05-04 VITALS
HEIGHT: 63 IN | DIASTOLIC BLOOD PRESSURE: 78 MMHG | WEIGHT: 110.01 LBS | OXYGEN SATURATION: 98 % | HEART RATE: 83 BPM | SYSTOLIC BLOOD PRESSURE: 176 MMHG | RESPIRATION RATE: 16 BRPM | TEMPERATURE: 98 F

## 2023-05-04 DIAGNOSIS — Z98.89 OTHER SPECIFIED POSTPROCEDURAL STATES: Chronic | ICD-10-CM

## 2023-05-04 LAB
ALBUMIN SERPL ELPH-MCNC: 4.2 G/DL — SIGNIFICANT CHANGE UP (ref 3.3–5)
ALP SERPL-CCNC: 83 U/L — SIGNIFICANT CHANGE UP (ref 40–120)
ALT FLD-CCNC: 27 U/L — SIGNIFICANT CHANGE UP (ref 10–45)
ANION GAP SERPL CALC-SCNC: 14 MMOL/L — SIGNIFICANT CHANGE UP (ref 5–17)
APPEARANCE UR: CLEAR — SIGNIFICANT CHANGE UP
APTT BLD: 27 SEC — LOW (ref 27.5–35.5)
AST SERPL-CCNC: 15 U/L — SIGNIFICANT CHANGE UP (ref 10–40)
BACTERIA # UR AUTO: NEGATIVE — SIGNIFICANT CHANGE UP
BASOPHILS # BLD AUTO: 0.02 K/UL — SIGNIFICANT CHANGE UP (ref 0–0.2)
BASOPHILS NFR BLD AUTO: 0.4 % — SIGNIFICANT CHANGE UP (ref 0–2)
BILIRUB SERPL-MCNC: 0.4 MG/DL — SIGNIFICANT CHANGE UP (ref 0.2–1.2)
BILIRUB UR-MCNC: NEGATIVE — SIGNIFICANT CHANGE UP
BUN SERPL-MCNC: 19 MG/DL — SIGNIFICANT CHANGE UP (ref 7–23)
CALCIUM SERPL-MCNC: 10.2 MG/DL — SIGNIFICANT CHANGE UP (ref 8.4–10.5)
CHLORIDE SERPL-SCNC: 110 MMOL/L — HIGH (ref 96–108)
CO2 SERPL-SCNC: 19 MMOL/L — LOW (ref 22–31)
COLOR SPEC: SIGNIFICANT CHANGE UP
CREAT SERPL-MCNC: 0.75 MG/DL — SIGNIFICANT CHANGE UP (ref 0.5–1.3)
DIFF PNL FLD: NEGATIVE — SIGNIFICANT CHANGE UP
EGFR: 73 ML/MIN/1.73M2 — SIGNIFICANT CHANGE UP
EOSINOPHIL # BLD AUTO: 0.44 K/UL — SIGNIFICANT CHANGE UP (ref 0–0.5)
EOSINOPHIL NFR BLD AUTO: 9.4 % — HIGH (ref 0–6)
EPI CELLS # UR: 1 /HPF — SIGNIFICANT CHANGE UP
GLUCOSE SERPL-MCNC: 93 MG/DL — SIGNIFICANT CHANGE UP (ref 70–99)
GLUCOSE UR QL: NEGATIVE — SIGNIFICANT CHANGE UP
HCT VFR BLD CALC: 41 % — SIGNIFICANT CHANGE UP (ref 34.5–45)
HGB BLD-MCNC: 13.1 G/DL — SIGNIFICANT CHANGE UP (ref 11.5–15.5)
IMM GRANULOCYTES NFR BLD AUTO: 0.2 % — SIGNIFICANT CHANGE UP (ref 0–0.9)
INR BLD: 1.09 RATIO — SIGNIFICANT CHANGE UP (ref 0.88–1.16)
KETONES UR-MCNC: NEGATIVE — SIGNIFICANT CHANGE UP
LEUKOCYTE ESTERASE UR-ACNC: ABNORMAL
LYMPHOCYTES # BLD AUTO: 1.43 K/UL — SIGNIFICANT CHANGE UP (ref 1–3.3)
LYMPHOCYTES # BLD AUTO: 30.5 % — SIGNIFICANT CHANGE UP (ref 13–44)
MAGNESIUM SERPL-MCNC: 2.2 MG/DL — SIGNIFICANT CHANGE UP (ref 1.6–2.6)
MCHC RBC-ENTMCNC: 31.2 PG — SIGNIFICANT CHANGE UP (ref 27–34)
MCHC RBC-ENTMCNC: 32 GM/DL — SIGNIFICANT CHANGE UP (ref 32–36)
MCV RBC AUTO: 97.6 FL — SIGNIFICANT CHANGE UP (ref 80–100)
MONOCYTES # BLD AUTO: 0.46 K/UL — SIGNIFICANT CHANGE UP (ref 0–0.9)
MONOCYTES NFR BLD AUTO: 9.8 % — SIGNIFICANT CHANGE UP (ref 2–14)
NEUTROPHILS # BLD AUTO: 2.33 K/UL — SIGNIFICANT CHANGE UP (ref 1.8–7.4)
NEUTROPHILS NFR BLD AUTO: 49.7 % — SIGNIFICANT CHANGE UP (ref 43–77)
NITRITE UR-MCNC: NEGATIVE — SIGNIFICANT CHANGE UP
NRBC # BLD: 0 /100 WBCS — SIGNIFICANT CHANGE UP (ref 0–0)
PH UR: 7 — SIGNIFICANT CHANGE UP (ref 5–8)
PLATELET # BLD AUTO: 128 K/UL — LOW (ref 150–400)
POTASSIUM SERPL-MCNC: 4.2 MMOL/L — SIGNIFICANT CHANGE UP (ref 3.5–5.3)
POTASSIUM SERPL-SCNC: 4.2 MMOL/L — SIGNIFICANT CHANGE UP (ref 3.5–5.3)
PROT SERPL-MCNC: 7.8 G/DL — SIGNIFICANT CHANGE UP (ref 6–8.3)
PROT UR-MCNC: ABNORMAL
PROTHROM AB SERPL-ACNC: 12.6 SEC — SIGNIFICANT CHANGE UP (ref 10.5–13.4)
RBC # BLD: 4.2 M/UL — SIGNIFICANT CHANGE UP (ref 3.8–5.2)
RBC # FLD: 13 % — SIGNIFICANT CHANGE UP (ref 10.3–14.5)
RBC CASTS # UR COMP ASSIST: 1 /HPF — SIGNIFICANT CHANGE UP (ref 0–4)
SODIUM SERPL-SCNC: 143 MMOL/L — SIGNIFICANT CHANGE UP (ref 135–145)
SP GR SPEC: 1.01 — SIGNIFICANT CHANGE UP (ref 1.01–1.02)
TROPONIN T, HIGH SENSITIVITY RESULT: 27 NG/L — SIGNIFICANT CHANGE UP (ref 0–51)
TROPONIN T, HIGH SENSITIVITY RESULT: 28 NG/L — SIGNIFICANT CHANGE UP (ref 0–51)
UROBILINOGEN FLD QL: NEGATIVE — SIGNIFICANT CHANGE UP
WBC # BLD: 4.69 K/UL — SIGNIFICANT CHANGE UP (ref 3.8–10.5)
WBC # FLD AUTO: 4.69 K/UL — SIGNIFICANT CHANGE UP (ref 3.8–10.5)
WBC UR QL: 3 /HPF — SIGNIFICANT CHANGE UP (ref 0–5)

## 2023-05-04 PROCEDURE — 85025 COMPLETE CBC W/AUTO DIFF WBC: CPT

## 2023-05-04 PROCEDURE — 71045 X-RAY EXAM CHEST 1 VIEW: CPT | Mod: 26

## 2023-05-04 PROCEDURE — 80053 COMPREHEN METABOLIC PANEL: CPT

## 2023-05-04 PROCEDURE — 83735 ASSAY OF MAGNESIUM: CPT

## 2023-05-04 PROCEDURE — 85610 PROTHROMBIN TIME: CPT

## 2023-05-04 PROCEDURE — 81001 URINALYSIS AUTO W/SCOPE: CPT

## 2023-05-04 PROCEDURE — 36415 COLL VENOUS BLD VENIPUNCTURE: CPT

## 2023-05-04 PROCEDURE — 71045 X-RAY EXAM CHEST 1 VIEW: CPT

## 2023-05-04 PROCEDURE — 85730 THROMBOPLASTIN TIME PARTIAL: CPT

## 2023-05-04 PROCEDURE — 84484 ASSAY OF TROPONIN QUANT: CPT

## 2023-05-04 PROCEDURE — 99285 EMERGENCY DEPT VISIT HI MDM: CPT | Mod: 25

## 2023-05-04 PROCEDURE — 87086 URINE CULTURE/COLONY COUNT: CPT

## 2023-05-04 PROCEDURE — 99284 EMERGENCY DEPT VISIT MOD MDM: CPT

## 2023-05-04 PROCEDURE — 93005 ELECTROCARDIOGRAM TRACING: CPT

## 2023-05-04 NOTE — ED PROVIDER NOTE - INTERNATIONAL TRAVEL
Received call from [facility] at [facility] with Red Flag Complaint. Subjective: Caller states \"he had allergies but it is worse. He is coughing non stop\"     Current Symptoms: coughing, vomiting from coughing, SOB when coughin    Onset: 4 days ago; worsening    Associated Symptoms: NA    Pain Severity: 0/10; Temperature: 102, 101, 100 (today) axillary    What has been tried: nebs    LMP: NA Pregnant: NA    Recommended disposition: See PCP within 24 Hours    Care advice provided, patient verbalizes understanding; denies any other questions or concerns; instructed to call back for any new or worsening symptoms. Patient/Caller agrees with recommended disposition; writer provided warm transfer to [place] at [facility] for appointment scheduling    Attention Provider: Thank you for allowing me to participate in the care of your patient. The patient was connected to triage in response to information provided to the [facility]. Please do not respond through this encounter as the response is not directed to a shared pool.         Reason for Disposition   [1] Age > 1 year  AND [2] continuous (non-stop) coughing keeps from feeding and sleeping AND [3] no improvement using cough treatment per guideline    Protocols used: COUGH-PEDIATRIC- No

## 2023-05-04 NOTE — ED PROVIDER NOTE - NS ED ATTENDING STATEMENT MOD
This was a shared visit with the MUNDO. I reviewed and verified the documentation and independently performed the documented:

## 2023-05-04 NOTE — ED PROVIDER NOTE - NSFOLLOWUPINSTRUCTIONS_ED_ALL_ED_FT
Follow up with your cardiologist in 2-3 days, or call [phone] and arrange a follow up with our clinic, state you were seen in the emergency department and would like a rapid appointment.    There were no signs of an emergency medical condition on completion of today's workup.  You will need further medical care and evaluation. A presumptive diagnosis of ***** has been made, however further evaluation may be required by your primary care doctor or specialist for a definitive diagnosis.      Follow up with your medical doctor in 2-3 days or call our clinic at [phone] and state you were seen in the Emergency Department and would like to be seen in clinic. You may also call [phone] to speak with a representative to assist follow up care with medicine, surgery, or specialists.    If you are having pain, take Tylenol/acetaminophen 1 g every six hours and supplement (if allowed by your physician, and if you're not having gastric/gastrointestinal/stomach/intestinal problems) with ibuprofen 600 mg, with food or milk/maalox, every six hours which can be taken three hours apart from the Tylenol to have a layered effect.     Be sure to take no more than 4000mg or 4g of Tylenol/acetaminophen in a 24 hour period. Be sure to check your other medications to see if they include Tylenol/acetaminophen and include them in your calculations to ensure you do not take more than 4000mg or 4g of Tylenol/acetaminophen a day.    Drink at least 2 Liters or 64 Ounces of water each day (UNLESS you are supposed to restrict fluids or have a history of congestive heart failure (CHF)).    Return for any persistent, worsening symptoms, or ANY concerns at all.

## 2023-05-04 NOTE — ED PROVIDER NOTE - PATIENT PORTAL LINK FT
You can access the FollowMyHealth Patient Portal offered by [facility] by registering at the following website: [email or web address]. By joining [employer]’s FollowMyHealth portal, you will also be able to view your health information using other applications (apps) compatible with our system.

## 2023-05-04 NOTE — ED PROVIDER NOTE - OBJECTIVE STATEMENT
89yo F with PMH HTN, hyperthyroidism, kidney disease, presenting with sudden onset of L-sided back pain earlier today around 2pm while sitting at home. Reports 2 episodes of sharp L flank pain, non-radiating, that lasted a few seconds and resolved on own. No recurrence of pain for several hours. No current pain. Denies fever/chills, HA, dizziness, CP, SOB, abdominal pain, n/v, numbness/tingling, weakness, recent illness, cough, recent fall, h/o kidney stones, urinary symptoms, rash. Daughter at bedside.

## 2023-05-04 NOTE — ED PROVIDER NOTE - MUSCULOSKELETAL, MLM
Spine appears normal, range of motion is not limited, no muscle or joint tenderness. Unable to reproduce back pain on exam. No midline spine ttp. No CVA ttp.

## 2023-05-04 NOTE — ED ADULT NURSE REASSESSMENT NOTE - NS ED NURSE REASSESS COMMENT FT1
RN able to get bloodwork but unable to obtain IV line. MISSY Mendez aware, okay to not attempt IV line again at this time.

## 2023-05-04 NOTE — ED ADULT NURSE NOTE - OBJECTIVE STATEMENT
Pt is a 89 y/o female pmhx of HTN presents to the ED seen in fast track area complaining of L sided sharp lower back pain. Pt was sitting when she had two episodes of sharp pain. Pt presents alert & oriented x 4, calm, able to follow commands, speech clear. Breathing spontaneous & nonlabored. Abdomen soft & nondistended, - CVA tenderness. Strength 5/5 x 4 extremities, ambulates with cane.. Strong peripheral pulses noted b/l, no edema noted. Skin warm, clean, dry & intact. Denies chest pain, SOB, abdominal pain, back pain, n/v/d, fever, chills, urinary symptoms. Call bell within reach and pt instructed on how to use, bed in lowest position, side rails up, wheels locked. Pt is a 89 y/o female pmhx of HTN presents to the ED seen in fast track area complaining of L sided sharp lower back pain. Pt was sitting when she had two episodes of sharp pain. Pt presents alert & oriented x 4, calm, able to follow commands, speech clear. Breathing spontaneous & nonlabored. Abdomen soft & nondistended, - CVA tenderness. Strength 5/5 x 4 extremities, ambulates with cane.. Strong peripheral pulses noted b/l, no edema noted. Skin warm, clean, dry & intact. Denies chest pain, SOB, abdominal pain, n/v/d, fever, chills, urinary symptoms. Call bell within reach and pt instructed on how to use, bed in lowest position, side rails up, wheels locked.

## 2023-05-04 NOTE — ED PROVIDER NOTE - ATTENDING APP SHARED VISIT CONTRIBUTION OF CARE
89-year-old who presents status post left posterior back pain at around 2 PM that lasted 10 seconds followed by another episode and this within the same hour.  Patient had no nausea sweating vomiting during these episodes.  Patient has not had any exertional chest pain.  Patient had no palpitations.  Normocephalic/atraumatic, no acute distress, nontachypneic nontachycardic, pleasant, cooperative, symmetrical bilateral radial pulses and equal  Cole Coronel MD, FACEP: In this physician's medical judgement based on clinical history and physical exam the patient's signs and symptoms lead to differential diagnoses which includes but is not limited to: Atypical chest pain, intercostal pain, muscle spasm, rhomboid spasm, pleuritic pain    Historical features, symptoms, and clinical exam not consistent with: Aortic dissection    Labs were ordered and independently reviewed by me.  EKG was ordered and independently reviewed by me.  Imaging was ordered and reviewed by me.    Appropriate medications for the patient's presenting complaints were ordered, and effects were reassessed.      History assisted by daughter.  Escalation to admission/observation was considered.    Will follow up on labs, therapeutics, imaging, reassess and disposition as clinically indicated.  *The above represents an initial assessment/impression. Please refer to my progress notes below for potential changes in patient clinical course* 89-year-old who presents status post left posterior back pain at around 2 PM that lasted 10 seconds followed by another episode and this within the same hour.  Patient had no nausea sweating vomiting during these episodes.  Patient has not had any exertional chest pain.  Patient had no palpitations.  Normocephalic/atraumatic, no acute distress, nontachypneic nontachycardic, pleasant, cooperative, symmetrical bilateral radial pulses and equal  Cole Coronel MD, FACEP: In this physician's medical judgement based on clinical history and physical exam the patient's signs and symptoms lead to differential diagnoses which includes but is not limited to: Atypical chest pain, intercostal pain, muscle spasm, rhomboid spasm, pleuritic pain    Historical features, symptoms, and clinical exam not consistent with: Aortic dissection    Labs were ordered and independently reviewed by me.  EKG was ordered and independently reviewed by me.  Imaging was ordered and reviewed by me.    Appropriate medications for the patient's presenting complaints were ordered, and effects were reassessed.      History assisted by daughter.  Escalation to admission/observation was considered. However, patient better served with outpatient primary medical doctor and/or cardiologist and given strict return precautions and expectant management.     Will follow up on labs, therapeutics, imaging, reassess and disposition as clinically indicated.  *The above represents an initial assessment/impression. Please refer to my progress notes below for potential changes in patient clinical course*  serologies, cxr, and ekg are not actionable   patient well appearing  The patient was serially evaluated throughout emergency department course by the team. There was no acute deterioration up to this time in the emergency department. The patient has demonstrated clinical improvement and/or stability, feels better at this time according to emergency department team. Agree with goals/plan of emergency department care as described in this physician's electronic medical record, including diagnostics, therapeutics and consultation recommendation as clinically warranted. Will discharge home with close outpatient follow up with primary care physician/provider and specialist if necessary. The patient and/or family was educated on expectant management and return precautions concerning signs and features to return to the emergency department, in layman terms, including but not limited to: nausea, vomiting, fever, chills, the inability to eat, take medications, or drink, persistent/worsening symptoms or any concerns at all. There are no acute or immediate life threatening issues present on history, clinical exam, or any diagnostic evaluation. The patient is a safe disposition home, has capacity and insight into their condition, is ambulatory in the Emergency Department with no further questions and will follow up with their doctor(s) this week. Diagnosis, prognosis, natural history and treatment was discussed with patient and/or family. The patient and/or family were given the opportunity to ask questions and have them answered in full. The patient and/or family are with capacity and insight into the situation, treatment, risks, benefits, alternative therapies, and understand that they can ask any further questions if needed. Patient and/or family/guardian understands anticipatory guidance and was given strict return and follow up precautions. The patient and/or family/guardian has been informed of the necessity to follow up with the PMD/Clinic/follow up as provided within 2-3 days, and the patient and/or family/guardian reports understanding of above with capacity and insight. The patient and/or family/guardian were informed of any results of their tests and are were encouraged to follow up on the findings with their doctor as well as the need to inform their doctor of any results. The patient and/or family/guardian are aware of the need to follow up with repeat testing as applicable and report understanding of the above with capacity and insight. The patient and/or family/guardian was made aware of any pending test results at the time of discharge and of the need to call back for the final results as well as the need to inform their doctor of the results.

## 2023-05-04 NOTE — ED ADULT TRIAGE NOTE - WEIGHT METHOD
stated

## 2023-05-04 NOTE — ED ADULT NURSE NOTE - NSIMPLEMENTINTERV_GEN_ALL_ED
Implemented All Fall with Harm Risk Interventions:  [place] to call system. Call bell, personal items and telephone within reach. Instruct patient to call for assistance. Room bathroom lighting operational. Non-slip footwear when patient is off stretcher. Physically safe environment: no spills, clutter or unnecessary equipment. Stretcher in lowest position, wheels locked, appropriate side rails in place. Provide visual cue, wrist band, yellow gown, etc. Monitor gait and stability. Monitor for mental status changes and reorient to person, place, and time. Review medications for side effects contributing to fall risk. Reinforce activity limits and safety measures with patient and family. Provide visual clues: red socks.

## 2023-05-06 LAB
CULTURE RESULTS: SIGNIFICANT CHANGE UP
SPECIMEN SOURCE: SIGNIFICANT CHANGE UP

## 2023-07-06 ENCOUNTER — APPOINTMENT (OUTPATIENT)
Dept: VASCULAR SURGERY | Facility: CLINIC | Age: 88
End: 2023-07-06
Payer: MEDICARE

## 2023-07-06 VITALS — TEMPERATURE: 97.7 F | HEART RATE: 58 BPM | SYSTOLIC BLOOD PRESSURE: 143 MMHG | DIASTOLIC BLOOD PRESSURE: 76 MMHG

## 2023-07-06 VITALS
HEIGHT: 61 IN | SYSTOLIC BLOOD PRESSURE: 145 MMHG | BODY MASS INDEX: 22.09 KG/M2 | WEIGHT: 117 LBS | HEART RATE: 58 BPM | DIASTOLIC BLOOD PRESSURE: 75 MMHG

## 2023-07-06 DIAGNOSIS — I73.9 PERIPHERAL VASCULAR DISEASE, UNSPECIFIED: ICD-10-CM

## 2023-07-06 PROCEDURE — 93922 UPR/L XTREMITY ART 2 LEVELS: CPT

## 2023-07-06 PROCEDURE — ZZZZZ: CPT

## 2023-07-07 PROBLEM — I73.9 PAD (PERIPHERAL ARTERY DISEASE): Status: ACTIVE | Noted: 2023-07-07

## 2023-07-07 NOTE — PHYSICAL EXAM
[0] : right 0 [1+] : right 1+ [2+] : left 2+ [Skin Ulcer] : no ulcer [Calm] : calm [de-identified] : elderly female, NAD [de-identified] : unlabored  [FreeTextEntry1] : leg warm and well perfused [de-identified] : soft, NT

## 2023-07-07 NOTE — HISTORY OF PRESENT ILLNESS
[FreeTextEntry1] : WIL MEYER (: May  5 1927) is a 89 year old female who presents today for PAD follow-up. \par \par Today she reports difficulty when changing positions from sitting to standing. She complains of RLE stiffness when changing positions which then affects her walking in the beginning. She states that the leg stiffness improves after walking for several steps. In general, she does not walk for very far distances. She denies classic calf claudication. No rest pain or wounds. \par \par PMH: HTN, hypothyroidism, gout, CRI, provoked DVT/PE (), former smoker\par \par ERICKA \par 2023 R 0.93, L 1.16\par 2023 R 0.67, L 1.2

## 2023-07-07 NOTE — ASSESSMENT
[FreeTextEntry1] : A/P\par \par PAD with minimal symptoms. ERICKA today relatively normal. Symptoms consistent with possible musculoskeletal versus radiculopathic etiology. \par \par Advise 6 month follow up for repeat ERICKA and symptom surveillance. Will consider cilostazol if concerning symptoms. \par \par Advise orthopedic referral per PMD.

## 2023-09-01 NOTE — ED ADULT NURSE NOTE - NS ED NOTE ABUSE SUSPICION NEGLECT YN
You were seen and treated emergency department for UTI. You have been prescribed levofloxacin 750 mg once a day for the next 4 days. Please return to the emergency department for any increased concerns, fevers, body aches or chills. Please call your urologist for follow-up appointment as well. No

## 2023-11-22 NOTE — ED ADULT TRIAGE NOTE - SPO2 (%)
Physical Therapy    Patient not seen in therapy.     Unavailable due to medical tests/procedures.      Pt is at bone bx. Will reattempt as time allows       OBJECTIVE                         Therapy procedure time and total treatment time can be found documented on the Time Entry flowsheet   98

## 2024-01-11 ENCOUNTER — APPOINTMENT (OUTPATIENT)
Dept: VASCULAR SURGERY | Facility: CLINIC | Age: 89
End: 2024-01-11
Payer: MEDICARE

## 2024-01-11 VITALS — TEMPERATURE: 97.7 F | SYSTOLIC BLOOD PRESSURE: 150 MMHG | HEART RATE: 58 BPM | DIASTOLIC BLOOD PRESSURE: 75 MMHG

## 2024-01-11 VITALS
DIASTOLIC BLOOD PRESSURE: 73 MMHG | WEIGHT: 117 LBS | HEART RATE: 57 BPM | SYSTOLIC BLOOD PRESSURE: 149 MMHG | HEIGHT: 61 IN | BODY MASS INDEX: 22.09 KG/M2

## 2024-01-11 DIAGNOSIS — I73.9 PERIPHERAL VASCULAR DISEASE, UNSPECIFIED: ICD-10-CM

## 2024-01-11 DIAGNOSIS — Z86.39 PERSONAL HISTORY OF OTHER ENDOCRINE, NUTRITIONAL AND METABOLIC DISEASE: ICD-10-CM

## 2024-01-11 DIAGNOSIS — E03.9 HYPOTHYROIDISM, UNSPECIFIED: ICD-10-CM

## 2024-01-11 PROCEDURE — 99214 OFFICE O/P EST MOD 30 MIN: CPT

## 2024-01-11 PROCEDURE — 93923 UPR/LXTR ART STDY 3+ LVLS: CPT

## 2024-01-11 PROCEDURE — 93970 EXTREMITY STUDY: CPT

## 2024-01-11 NOTE — ASSESSMENT
[FreeTextEntry1] :  A/P  PAD with short distance claudication. Symptoms worse in the RLE where ERICKA is reduced. PVR waveforms suggestive of tibial disease. Per initial consult note, would not intervene for claudication. Discussed cilostazol therapy. Patient states she is on Pentoxifylline and wants to continue this medication. She declines cilostazol at this time. Advise walking/exercise regimen and diligent foot and skin care. She already follows with podiatry.   Bilateral lower extremity edema (chronic) with evidence of subacute BK DVTs on VLE today. She denies any inciting factors. No chest pain/SOB. Has prior history of provoked DVT/PE in 2010 treated with Coumadin. R/B/A of anticoagulation considered. Advise initiating anticoagulation tonight with Xarelto starter pack to reduce risk of DVT propagation. Patient provided with samples and directions for use explained. Precautions while on a/c also discussed with patient and her daughter. She may continue with low dose aspirin (she takes it every other day).   Follow up in one week for repeat VLE to r/o propagation.  Patient advised to call our office for earlier evaluation if new or concerning symptoms.

## 2024-01-11 NOTE — PHYSICAL EXAM
[2+] : left 2+ [0] : left 0 [1+] : left 1+ [Alert] : alert [Oriented to Person] : oriented to person [Oriented to Place] : oriented to place [Oriented to Time] : oriented to time [Calm] : calm [Skin Ulcer] : no ulcer [de-identified] : elderly female, NAD [de-identified] : unlabored  [FreeTextEntry1] : leg warm and well perfused Bilateral ankle swelling with gaiter hyperpigmentation Bilateral varicose veins [de-identified] : soft, NT

## 2024-01-11 NOTE — HISTORY OF PRESENT ILLNESS
[FreeTextEntry1] :  WIL MEYER (: May  5 1927) is a 89 year old female who presents today for PAD follow-up.   Today she reports limited walking due to leg pain. She states that she wishes she could walk for longer distances. She complains of bilateral R>>L calf aching and pain when walking. This resolves with rest. She does not think that she can walk for more than 100 feet prior to stopping. Denies rest pain or wounds.   She also complains of varicose veins today and chronic leg swelling. She has a history of a provoked DVT/PE in  which was treated with Coumadin. She denies any recent travel, immobility, or prolonged illness. She is interested in treating her venous disease.   PMH: HTN, hypothyroidism, gout, CRI, provoked DVT/PE (), former smoker  ERICKA  2024 R 0.72, L 1.17 2023 R 0.93, L 1.16 2023 R 0.67, L 1.2  2024 Bilateral VLE obtained today  RLE - subacute minimally recanalized DVT in 1/2 PTV LLE - subacute occlusive DVT in 1/2 peroneal veins No evidence of venous reflux

## 2024-01-18 ENCOUNTER — APPOINTMENT (OUTPATIENT)
Dept: VASCULAR SURGERY | Facility: CLINIC | Age: 89
End: 2024-01-18
Payer: MEDICARE

## 2024-01-18 VITALS — SYSTOLIC BLOOD PRESSURE: 125 MMHG | DIASTOLIC BLOOD PRESSURE: 76 MMHG | HEART RATE: 61 BPM

## 2024-01-18 VITALS
WEIGHT: 117 LBS | SYSTOLIC BLOOD PRESSURE: 135 MMHG | HEIGHT: 61 IN | BODY MASS INDEX: 22.09 KG/M2 | TEMPERATURE: 98 F | DIASTOLIC BLOOD PRESSURE: 71 MMHG | HEART RATE: 59 BPM

## 2024-01-18 PROCEDURE — 93970 EXTREMITY STUDY: CPT

## 2024-01-18 PROCEDURE — 99212 OFFICE O/P EST SF 10 MIN: CPT

## 2024-01-18 NOTE — HISTORY OF PRESENT ILLNESS
[FreeTextEntry1] :  WIL MEYER (: May  5 1927) is a 89 year old female who presents today for one week DVT follow up.   No new issues today.  She reports compliance with Xarelto 15mg BID. No concerns for bleeding/bruising.   Repeat duplex today without DVT propagation. Stable appearance of RLE subacute non-occlusive DVT in single PTV. Stable LLE subacute occlusive DVT in single peroneal vein.

## 2024-01-18 NOTE — PHYSICAL EXAM
[0] : left 0 [1+] : left 1+ [Skin Ulcer] : no ulcer [Alert] : alert [Oriented to Person] : oriented to person [Oriented to Place] : oriented to place [Oriented to Time] : oriented to time [Calm] : calm [de-identified] : elderly female, NAD [de-identified] : unlabored  [FreeTextEntry1] : leg warm and well perfused no leg swelling today

## 2024-01-18 NOTE — ASSESSMENT
[FreeTextEntry1] :  A/P  Unprovoked bilateral lower extremity DVTs without propagation on duplex today.  Advise anticoagulation x 6 months. Script sent to pharmacy.  Advise CS if swelling and/or for extended travel.   Patient will f/u in 6 months for PAD. She will d/c pentoxifylline. Consider cilostazol therapy after discussing with cardio Dr. Freddie Croft.

## 2024-01-24 ENCOUNTER — NON-APPOINTMENT (OUTPATIENT)
Age: 89
End: 2024-01-24

## 2024-01-24 DIAGNOSIS — I82.403 ACUTE EMBOLISM AND THROMBOSIS OF UNSPECIFIED DEEP VEINS OF LOWER EXTREMITY, BILATERAL: ICD-10-CM

## 2024-01-24 RX ORDER — RIVAROXABAN 20 MG/1
20 TABLET, FILM COATED ORAL
Qty: 30 | Refills: 4 | Status: DISCONTINUED | COMMUNITY
Start: 2024-01-18 | End: 2024-01-24

## 2024-05-28 RX ORDER — RIVAROXABAN 20 MG/1
20 TABLET, FILM COATED ORAL
Qty: 30 | Refills: 1 | Status: ACTIVE | COMMUNITY
Start: 2024-01-24 | End: 1900-01-01

## 2024-07-25 ENCOUNTER — APPOINTMENT (OUTPATIENT)
Dept: VASCULAR SURGERY | Facility: CLINIC | Age: 89
End: 2024-07-25
Payer: MEDICARE

## 2024-07-25 VITALS
TEMPERATURE: 97.9 F | DIASTOLIC BLOOD PRESSURE: 74 MMHG | BODY MASS INDEX: 22.97 KG/M2 | HEART RATE: 54 BPM | SYSTOLIC BLOOD PRESSURE: 163 MMHG | WEIGHT: 117 LBS | HEIGHT: 60 IN

## 2024-07-25 VITALS — SYSTOLIC BLOOD PRESSURE: 156 MMHG | DIASTOLIC BLOOD PRESSURE: 76 MMHG | HEART RATE: 55 BPM

## 2024-07-25 DIAGNOSIS — I82.403 ACUTE EMBOLISM AND THROMBOSIS OF UNSPECIFIED DEEP VEINS OF LOWER EXTREMITY, BILATERAL: ICD-10-CM

## 2024-07-25 DIAGNOSIS — I73.9 PERIPHERAL VASCULAR DISEASE, UNSPECIFIED: ICD-10-CM

## 2024-07-25 PROCEDURE — 93970 EXTREMITY STUDY: CPT

## 2024-07-25 PROCEDURE — 99213 OFFICE O/P EST LOW 20 MIN: CPT

## 2024-07-25 PROCEDURE — 93923 UPR/LXTR ART STDY 3+ LVLS: CPT

## 2024-07-25 RX ORDER — APIXABAN 5 MG/1
5 TABLET, FILM COATED ORAL
Refills: 0 | Status: ACTIVE | COMMUNITY

## 2024-07-29 NOTE — ASSESSMENT
[FreeTextEntry1] : A/P  PAD with limited walking distance. Mildly reduced ERICKA on the right.  Advise conservative measures only at this time given age and lack of severe symptoms.  Walking/exercise regimen discussed in addition to foot/skin care/hygiene.  Follow up in 1 year for re-evaluation.  Patient advised to call our office for earlier evaluation if new or concerning symptoms.   Bilateral LE below knee DVTs on anticoagulation. No evidence of DVT/SVT on duplex today.  Negative hematology workup.  Defer anticoagulation management to hematologist.

## 2024-07-29 NOTE — PHYSICAL EXAM
[2+] : left 2+ [0] : right 0 [1+] : left 1+ [Skin Ulcer] : no ulcer [Alert] : alert [Oriented to Person] : oriented to person [Oriented to Place] : oriented to place [Oriented to Time] : oriented to time [Calm] : calm [de-identified] : elderly female, NAD [de-identified] : unlabored  [FreeTextEntry1] : leg warm and well perfused no leg swelling today

## 2024-07-29 NOTE — HISTORY OF PRESENT ILLNESS
[FreeTextEntry1] :   WIL MEYER (: May  5 1927) is a 89 year old female who presents today for PAD follow-up.   No new complaints today. Stable calf pain at short distances resolves with rest. No rest pain or wounds.   H/O unprovoked bilateral lower extremity DVTs in 2024. Started on Xarelto regimen and switched to Eliquis per hematologist Dr. Aileen Linares. Workup reportedly negative for hypercoagulability. Prior h/o DVT/PE in  treated with Coumadin. Patient states hematologist is awaiting repeat VLE to determine length of treatment with anticoagulation.  PMH: HTN, hypothyroidism, gout, CRI, provoked DVT/PE (), former smoker  2024 ERICKA R 0.91, L 1.16 Bilateral LE VLE negative for DVT/SVT  Previous testing 2024 VLE with bilateral subacute DVTs - R PTV and L peroneal veins  ERICKA 2024 R 0.72, L 1.17 2023 R 0.93, L 1.16 2023 R 0.67, L 1.2

## 2024-07-29 NOTE — PHYSICAL EXAM
[2+] : left 2+ [0] : right 0 [1+] : left 1+ [Skin Ulcer] : no ulcer [Alert] : alert [Oriented to Person] : oriented to person [Oriented to Place] : oriented to place [Oriented to Time] : oriented to time [Calm] : calm [de-identified] : elderly female, NAD [de-identified] : unlabored  [FreeTextEntry1] : leg warm and well perfused no leg swelling today

## 2024-09-24 ENCOUNTER — EMERGENCY (EMERGENCY)
Facility: HOSPITAL | Age: 89
LOS: 1 days | Discharge: HOME | End: 2024-09-24
Attending: STUDENT IN AN ORGANIZED HEALTH CARE EDUCATION/TRAINING PROGRAM | Admitting: STUDENT IN AN ORGANIZED HEALTH CARE EDUCATION/TRAINING PROGRAM
Payer: MEDICARE

## 2024-09-24 VITALS
TEMPERATURE: 98 F | HEART RATE: 68 BPM | OXYGEN SATURATION: 100 % | SYSTOLIC BLOOD PRESSURE: 131 MMHG | DIASTOLIC BLOOD PRESSURE: 84 MMHG | RESPIRATION RATE: 20 BRPM

## 2024-09-24 VITALS
HEART RATE: 85 BPM | TEMPERATURE: 98 F | WEIGHT: 110.23 LBS | OXYGEN SATURATION: 98 % | RESPIRATION RATE: 18 BRPM | SYSTOLIC BLOOD PRESSURE: 130 MMHG | HEIGHT: 60 IN | DIASTOLIC BLOOD PRESSURE: 75 MMHG

## 2024-09-24 DIAGNOSIS — Z98.89 OTHER SPECIFIED POSTPROCEDURAL STATES: Chronic | ICD-10-CM

## 2024-09-24 LAB
ALBUMIN SERPL ELPH-MCNC: 4.2 G/DL — SIGNIFICANT CHANGE UP (ref 3.3–5)
ALP SERPL-CCNC: 82 U/L — SIGNIFICANT CHANGE UP (ref 40–120)
ALT FLD-CCNC: 14 U/L — SIGNIFICANT CHANGE UP (ref 4–33)
ANION GAP SERPL CALC-SCNC: 13 MMOL/L — SIGNIFICANT CHANGE UP (ref 7–14)
APPEARANCE UR: CLEAR — SIGNIFICANT CHANGE UP
APTT BLD: 32.1 SEC — SIGNIFICANT CHANGE UP (ref 24.5–35.6)
AST SERPL-CCNC: 13 U/L — SIGNIFICANT CHANGE UP (ref 4–32)
BASOPHILS # BLD AUTO: 0.03 K/UL — SIGNIFICANT CHANGE UP (ref 0–0.2)
BASOPHILS NFR BLD AUTO: 0.7 % — SIGNIFICANT CHANGE UP (ref 0–2)
BILIRUB SERPL-MCNC: 0.4 MG/DL — SIGNIFICANT CHANGE UP (ref 0.2–1.2)
BILIRUB UR-MCNC: NEGATIVE — SIGNIFICANT CHANGE UP
BUN SERPL-MCNC: 16 MG/DL — SIGNIFICANT CHANGE UP (ref 7–23)
CALCIUM SERPL-MCNC: 10.1 MG/DL — SIGNIFICANT CHANGE UP (ref 8.4–10.5)
CHLORIDE SERPL-SCNC: 106 MMOL/L — SIGNIFICANT CHANGE UP (ref 98–107)
CO2 SERPL-SCNC: 21 MMOL/L — LOW (ref 22–31)
COLOR SPEC: YELLOW — SIGNIFICANT CHANGE UP
CREAT SERPL-MCNC: 0.97 MG/DL — SIGNIFICANT CHANGE UP (ref 0.5–1.3)
DIFF PNL FLD: NEGATIVE — SIGNIFICANT CHANGE UP
EGFR: 53 ML/MIN/1.73M2 — LOW
EOSINOPHIL # BLD AUTO: 0.2 K/UL — SIGNIFICANT CHANGE UP (ref 0–0.5)
EOSINOPHIL NFR BLD AUTO: 4.7 % — SIGNIFICANT CHANGE UP (ref 0–6)
FLUAV AG NPH QL: SIGNIFICANT CHANGE UP
FLUBV AG NPH QL: SIGNIFICANT CHANGE UP
GLUCOSE SERPL-MCNC: 98 MG/DL — SIGNIFICANT CHANGE UP (ref 70–99)
GLUCOSE UR QL: NEGATIVE MG/DL — SIGNIFICANT CHANGE UP
HCT VFR BLD CALC: 40.8 % — SIGNIFICANT CHANGE UP (ref 34.5–45)
HGB BLD-MCNC: 13.9 G/DL — SIGNIFICANT CHANGE UP (ref 11.5–15.5)
IANC: 2.66 K/UL — SIGNIFICANT CHANGE UP (ref 1.8–7.4)
IMM GRANULOCYTES NFR BLD AUTO: 0.5 % — SIGNIFICANT CHANGE UP (ref 0–0.9)
INR BLD: 1.08 RATIO — SIGNIFICANT CHANGE UP (ref 0.85–1.16)
KETONES UR-MCNC: NEGATIVE MG/DL — SIGNIFICANT CHANGE UP
LEUKOCYTE ESTERASE UR-ACNC: NEGATIVE — SIGNIFICANT CHANGE UP
LYMPHOCYTES # BLD AUTO: 1.01 K/UL — SIGNIFICANT CHANGE UP (ref 1–3.3)
LYMPHOCYTES # BLD AUTO: 23.6 % — SIGNIFICANT CHANGE UP (ref 13–44)
MCHC RBC-ENTMCNC: 32.4 PG — SIGNIFICANT CHANGE UP (ref 27–34)
MCHC RBC-ENTMCNC: 34.1 GM/DL — SIGNIFICANT CHANGE UP (ref 32–36)
MCV RBC AUTO: 95.1 FL — SIGNIFICANT CHANGE UP (ref 80–100)
MONOCYTES # BLD AUTO: 0.36 K/UL — SIGNIFICANT CHANGE UP (ref 0–0.9)
MONOCYTES NFR BLD AUTO: 8.4 % — SIGNIFICANT CHANGE UP (ref 2–14)
NEUTROPHILS # BLD AUTO: 2.66 K/UL — SIGNIFICANT CHANGE UP (ref 1.8–7.4)
NEUTROPHILS NFR BLD AUTO: 62.1 % — SIGNIFICANT CHANGE UP (ref 43–77)
NITRITE UR-MCNC: NEGATIVE — SIGNIFICANT CHANGE UP
NRBC # BLD: 0 /100 WBCS — SIGNIFICANT CHANGE UP (ref 0–0)
NRBC # FLD: 0 K/UL — SIGNIFICANT CHANGE UP (ref 0–0)
PH UR: 7.5 — SIGNIFICANT CHANGE UP (ref 5–8)
PLATELET # BLD AUTO: 131 K/UL — LOW (ref 150–400)
POTASSIUM SERPL-MCNC: 3.8 MMOL/L — SIGNIFICANT CHANGE UP (ref 3.5–5.3)
POTASSIUM SERPL-SCNC: 3.8 MMOL/L — SIGNIFICANT CHANGE UP (ref 3.5–5.3)
PROT SERPL-MCNC: 8.1 G/DL — SIGNIFICANT CHANGE UP (ref 6–8.3)
PROT UR-MCNC: SIGNIFICANT CHANGE UP MG/DL
PROTHROM AB SERPL-ACNC: 12.9 SEC — SIGNIFICANT CHANGE UP (ref 9.9–13.4)
RBC # BLD: 4.29 M/UL — SIGNIFICANT CHANGE UP (ref 3.8–5.2)
RBC # FLD: 13.3 % — SIGNIFICANT CHANGE UP (ref 10.3–14.5)
RSV RNA NPH QL NAA+NON-PROBE: SIGNIFICANT CHANGE UP
SARS-COV-2 RNA SPEC QL NAA+PROBE: SIGNIFICANT CHANGE UP
SODIUM SERPL-SCNC: 140 MMOL/L — SIGNIFICANT CHANGE UP (ref 135–145)
SP GR SPEC: 1.01 — SIGNIFICANT CHANGE UP (ref 1–1.03)
TROPONIN T, HIGH SENSITIVITY RESULT: 27 NG/L — SIGNIFICANT CHANGE UP
TROPONIN T, HIGH SENSITIVITY RESULT: 36 NG/L — SIGNIFICANT CHANGE UP
UROBILINOGEN FLD QL: 0.2 MG/DL — SIGNIFICANT CHANGE UP (ref 0.2–1)
WBC # BLD: 4.28 K/UL — SIGNIFICANT CHANGE UP (ref 3.8–10.5)
WBC # FLD AUTO: 4.28 K/UL — SIGNIFICANT CHANGE UP (ref 3.8–10.5)

## 2024-09-24 PROCEDURE — 70498 CT ANGIOGRAPHY NECK: CPT | Mod: 26,MC

## 2024-09-24 PROCEDURE — 70496 CT ANGIOGRAPHY HEAD: CPT | Mod: 26,MC

## 2024-09-24 PROCEDURE — 71045 X-RAY EXAM CHEST 1 VIEW: CPT | Mod: 26

## 2024-09-24 PROCEDURE — 99285 EMERGENCY DEPT VISIT HI MDM: CPT

## 2024-09-24 PROCEDURE — 93010 ELECTROCARDIOGRAM REPORT: CPT

## 2024-09-24 RX ORDER — SODIUM CHLORIDE 9 MG/ML
500 INJECTION INTRAMUSCULAR; INTRAVENOUS; SUBCUTANEOUS ONCE
Refills: 0 | Status: COMPLETED | OUTPATIENT
Start: 2024-09-24 | End: 2024-09-24

## 2024-09-24 RX ADMIN — SODIUM CHLORIDE 500 MILLILITER(S): 9 INJECTION INTRAMUSCULAR; INTRAVENOUS; SUBCUTANEOUS at 18:00

## 2024-09-24 NOTE — ED PROVIDER NOTE - WR ORDER NAME 1
11/02/18 1420   Pain Assessment   Pain Assessment No/denies pain   Pain Score No Pain   Restrictions/Precautions   Precautions Fall Risk;Supervision on toilet/commode   Weight Bearing Restrictions No   ROM Restrictions No   Braces or Orthoses (none)   Cognition   Arousal/Participation Cooperative; Alert   Subjective   Subjective Pt reports he is ready for therapy   QI: Sit to Stand   Assistance Needed Physical assistance   Assistance Provided by [place] Less than 25%   Sit to Stand CARE Score 3   QI: Chair/Bed-to-Chair Transfer   Assistance Needed Physical assistance   Assistance Provided by [place] Less than 25%   Chair/Bed-to-Chair Transfer CARE Score 3   Transfer Bed/Chair/Wheelchair   Limitations Noted In Balance; Endurance;UE Strength;LE Strength   Adaptive Equipment Hand Hold   Stand Pivot Minimal Assist   Sit to Stand Minimal Assist   Stand to Sit Minimal Assist   Bed, Chair, Wheelchair Transfer (FIM) 2 - [place] needs to lift or boost to rise AND assist to sit   QI: Walk 10 Feet   Assistance Needed Physical assistance   Assistance Provided by [place] Total assistance   Comment ModAx1 with Min/ModAx1 with chair follow   Walk 10 Feet CARE Score 1   QI: Walk 50 Feet with Two Turns   Assistance Needed Physical assistance   Assistance Provided by [place] Total assistance   Comment ModAx1 with Min/ModAx1 with chair follow   Walk 50 Feet with Two Turns CARE Score 1   QI: Walk 150 Feet   Assistance Needed Physical assistance   Assistance Provided by [place] Less than 25%   Comment ModAx1 with Min/ModAx1 with chair follow   Walk 150 Feet CARE Score 3   Ambulation   Does the patient walk? 2  Yes   Primary Discharge Mode of Locomotion Walk;Wheelchair  (WC vs walk based on pt progress)   Walk Assist Level Moderate Assist  (ModAx1 with Min/ModAx1 with chair follow)   Gait Pattern L hemiparesis; Improper weight shift;Decreased R stance;Decreased L stance; Step through   Assist Device Hand Hold  (HHAx2)   Distance Walked (feet) 850 ft   Limitations Noted In Balance; Coordination;Midline Orientation;Posture;Speed;Swing;Strength   Findings ModAx1 with Min/ModAx1 with chair follow   Walking (FIM) 1 - Patient requires assist of two people   Therapeutic Interventions   Neuromuscular Re-Education seated AAROM LUE D1 and D2 patterns and BUE lift pattern starting at L hip to fatigue, seated weight shift on therapy ball, seated marches on therapy ball    Assessment   Treatment Assessment Pt toelrated treatment session well  Session focused on ambulation outside BWSS and UE neuromuscular re-ed  Used L AFO to assist in controlling L knee hyperextension  Increased in hyperextension likely secondary to increased fatigue  Pt continues to lean to L while walking outside BWSS but demonstrated improvement since last time walking outside BWSS  Pt required AAROM during UE PNF patterns; pt required assist to lead with hand instead of leading with shoulder  Approximation and faciliation of normal scapulohumeral rhythem to minimize risk of shoulder impingement  Pt reported maintaining sitting balance on therapy ball was easy however demonstrated difficulty with seated marches on therapy ball  Pt will continue to benefit from skilled PT to address functional deficits to promote progress toward LTGs  Family/Caregiver Present friends   Problem List Decreased strength;Decreased endurance; Impaired balance;Decreased mobility; Decreased range of motion;Decreased coordination   Barriers to Discharge Decreased caregiver support   PT Barriers   Physical Impairment Decreased strength;Decreased range of motion;Decreased endurance; Impaired balance;Decreased mobility; Decreased coordination;Decreased cognition   Functional Limitation Car transfers;Stair negotiation;Standing;Transfers; Walking   Plan   Treatment/Interventions Functional transfer training;LE strengthening/ROM; Elevations; Therapeutic exercise; Endurance training;Patient/family training;Equipment eval/education; Bed mobility;Gait training; Compensatory technique education   Progress Progressing toward goals   Recommendation   Recommendation Home with family support; Outpatient PT   Equipment Recommended (LRAD)   PT Therapy Minutes   PT Time In 1420   PT Time Out 1500   PT Total Time (minutes) 40   PT Mode of treatment - Individual (minutes) 40   PT Mode of treatment - Concurrent (minutes) 0   PT Mode of treatment - Group (minutes) 0   PT Mode of treatment - Co-treat (minutes) 0   PT Mode of Teatment - Total time(minutes) 40 minutes   Therapy Time missed   Time missed?  No Xray Chest 1 View AP/PA

## 2024-09-24 NOTE — CONSULT NOTE ADULT - ASSESSMENT
Impression:    Recommendations: 89F R-handed PMHx HTN, HLD, b/l lower extremity DVT (previously on Xarelto then Eliquis) presenting with lightheadedness/dizziness. Pt notes that she began feeling lightheaded and thought she was going to "pass out." Denied room spinning sensation although she mentions that 2 weeks prior where she had a room-spinning sensation for several minutes which completely resolved. Pt took her BP medications as she normally does daily. Endorse some diaphoresis potentially during the episode but no chest pain or palpitations. Pt is non-focal on exam. Orthostatics showing systolic drop of 18 from lying to sitting. CBC and CMP unremarkable. UA and COVID negative.     Impression: Dizziness described as lightheadedness w/ sensation of diaphoresis and "feeling hot" prior to episode possibly pre-syncopal symptoms due to non-neurological etiology. Consider cardiac etiology.     Recommendations:  - CTH w/o contrast  - CTA H/N  - If above neuroimaging is negative, pt can follow-up with either Dr. Moore or Dr. Dasilva for MRI brain; [phone] [address] [address]  - If pt staying in hospital for other non-neurological reasons, MRI brain w/o contrast can be pursued inpatient  - Cardiac work-up as appropriate  - IVF as needed  - Neurochecks and vitals Q8H  - Rest of care per primary team    Case d/w Neurology attending.

## 2024-09-24 NOTE — ED PROVIDER NOTE - CLINICAL SUMMARY MEDICAL DECISION MAKING FREE TEXT BOX
89yF with hx as per hpi presents with lightheadedness. Code stroke initially called but canceled as unclear timeline LKw likely >24hrs as patient stated woke up monday AM with symptoms. Exam shows left beating nystagmus on horizontal gaze. No FNDs. Will obtain infectious workup, CTH imaging, possible neuro consult. Dispo pending results.

## 2024-09-24 NOTE — ED PROVIDER NOTE - PATIENT PORTAL LINK FT
You can access the FollowMyHealth Patient Portal offered by [facility] by registering at the following website: [email or web address]. By joining [facility]’s FollowMyHealth portal, you will also be able to view your health information using other applications (apps) compatible with our system.

## 2024-09-24 NOTE — ED PROVIDER NOTE - CARE PROVIDER_API CALL
Sukhwinder Moore.  Neurology  [address], [address]  [place] [address]  Phone: [phone]  Fax: [phone]  Follow Up Time: Routine

## 2024-09-24 NOTE — ED PROVIDER NOTE - OBJECTIVE STATEMENT
89-year-old female with PMH HTN, HLD, hyperthyroidism and methimazole, gout, PVD, peripheral neuropathy, bilateral lower extremity DVTs was on Xarelto from January to July and then on a few weeks of Eliquis discontinued as DVT is resolved last dose Eliquis in July 2024 presents for lightheadedness/dizziness.  Patient states that she feels lightheaded like she is going to faint and not room spinning dizziness.  It is unclear what exactly when the symptoms started as patient thinks that either started when she woke up yesterday on Monday or later in the day during lunchtime at around 3 PM but patient does not remember.  The dizziness has been persistent since then however at the time of evaluating patient  she denies dizziness.  Denies recent fever, chills, chest pain, shortness of breath, cough, congestion, rhinorrhea, abdominal pain, nausea, vomiting, dysuria, constipation, diarrhea.  Patient has been eating and drinking normally.  Patient had an episode of vertigo 2 weeks ago and described the dizziness as room spinning which was different than the dizziness she feels now.  This was per daughter at bedside.

## 2024-09-24 NOTE — ED ADULT TRIAGE NOTE - WEIGHT IN KG
----- Message from Go Arce MD sent at 5/10/2022  1:31 PM CDT -----  Please share results with patient and PCP.  Recommend colonoscopy f/u 3-5 years.  ----- Message -----  From: Lab, Background User  Sent: 5/10/2022  11:26 AM CDT  To: Go Arce MD       50

## 2024-09-24 NOTE — ED PROVIDER NOTE - PHYSICAL EXAMINATION
GENERAL: elderly appearing in no acute distress, non-toxic appearing  HEAD: normocephalic, atraumatic  HEENT: normal conjunctiva, oral mucosa moist, uvula midline, EOMI  CARDIAC: regular rate and rhythm, normal S1S2, no appreciable murmurs, 2+ pulses in UE b/l  PULM: normal breath sounds, clear to ascultation bilaterally, no rales, rhonchi, wheezing  GI: abdomen nondistended, soft, nontender, no guarding, rebound tenderness  : no suprapubic tenderness  NEURO: no gross motor or sensory deficits, CN2-12 grossly intact, EOMI, +left-beating nystagmus on horizontal gaze, normal speech, PERRL 3mm b/l, EOMI, AAOx3, no dysmetria normal finger to nose  MSK: no peripheral edema, no calf tenderness b/l  SKIN: well-perfused, extremities warm, no visible rashes  PSYCH: appropriate mood and affect

## 2024-09-24 NOTE — ED ADULT NURSE NOTE - OBJECTIVE STATEMENT
A&Ox4. ambulatory. c/o dizziness since yesterday. NAD. daughter at bedside states that the PT had one tea biscuit and tea yesterday. pt denies SOB, chest pain, dizziness, weakness, urinary symptoms, HA, n/v/d, fevers, chills, pain. respirations are even and un labored. ABD is soft and non tender. skin intact. IV placed by float PATY Reid. labs drawn and sent. safety precautions maintained.

## 2024-09-24 NOTE — ED ADULT TRIAGE NOTE - CHIEF COMPLAINT QUOTE
pt c/o of dizziness and off balance since yesterday, onset 3 pm, pt with hx DVT to lower extremity, last blood thinner used in July, pt denies any chest pain or sob

## 2024-09-24 NOTE — ED PROVIDER NOTE - ATTENDING CONTRIBUTION TO CARE
Santiago Corea, :  patient seen and evaluated with the resident.  I was present for key portions of the History & Physical, and I agree with the Impression & Plan. 89 yo f pmh HTN, HLD, hyperthyroidism and methimazole, gout, PVD, peripheral neuropathy, bilateral lower extremity DVTs was on Xarelto from January to July and then on a few weeks of Eliquis discontinued as DVT is resolved last dose Eliquis in July 2024 ,pw dizziness/lightheadedness. Initially upon arrival to ED, daughter stated last known normal was 1500 on 9/23, patient arrived at approximately 1400 on 9/24.  Code stroke was initiated in triage.  Upon arrival to ED, when I evaluated patient she said she woke up with symptoms on 9/23.  At this point a code stroke was canceled.  Patient reports dizziness, head spinning, intermittent.  Also reports lightheadedness, fatigue.  Reports malaise, myalgias.  Denies recent travel, sick cough, trauma.  Daughter bedside provides collateral.  Patient arrives HDS, well-appearing.  AAOx4.  Will do infectious workup versus TIA/CVA workup.  Labs, imaging, reassess.

## 2024-09-24 NOTE — ED PROVIDER NOTE - NSFOLLOWUPINSTRUCTIONS_ED_ALL_ED_FT
Today in the emergency department you were evaluated for lightheadedness.  We did a CT scan that was negative.  Please follow-up with neurology within 1 to 2 weeks after discharge from the emergency department.    Please follow up with your primary care physician within 1-2 weeks of discharge from the emergency department.  Please bring a copy of your results with you.  Please return to the emergency department for worsening of your symptoms.    You may take Acetaminophen over the counter as needed for pain and/or fever. Use as directed and see medication warnings.

## 2024-09-24 NOTE — ED ADULT NURSE REASSESSMENT NOTE - NS ED NURSE REASSESS COMMENT FT1
Pt handoff received, remains at baseline mental status, family at the bedside. Breathing is equal and unlabored on room air. NAD Pending dispo.

## 2024-09-24 NOTE — CONSULT NOTE ADULT - SUBJECTIVE AND OBJECTIVE BOX
Neurology - Consult Note    Spectra: [phone] ([facility]), [phone] ([facility])    HPI:  89F PMHx HTN, HLD, b/l lower extremity DVT (previously on Xarelto then Eliquis) presenting with lightheadedness/dizziness.     Review of Systems:  INCOMPLETE   CONSTITUTIONAL: No fevers or chills  EYES AND ENT: No visual changes or no throat pain   NECK: No pain or stiffness  RESPIRATORY: No shortness of breath  CARDIOVASCULAR: No chest pain or palpitations  GASTROINTESTINAL: No nausea or vomiting   GENITOURINARY: No dysuria  NEUROLOGICAL: +As stated in HPI above  SKIN: No itching, burning, rashes, or lesions   All other review of systems is negative unless indicated above.    Allergies:  No Known Allergies      PMHx/PSHx/Family Hx: As above, otherwise see below   HTN (hypertension)    Hypothyroid    Osteoporosis    Pulmonary embolism    Uric acid serum decreased    Gout    Hyperthyroidism        Social Hx:  Never smoker; no current use of tobacco, alcohol, or illicit drugs  Lives with ***; occupation ***, baseline functional status is ***    Medications:  MEDICATIONS  (STANDING):    MEDICATIONS  (PRN):      Vitals:  T(C): 36.9 (09-24-24 @ 14:38), Max: 36.9 (09-24-24 @ 14:38)  HR: 85 (09-24-24 @ 13:33) (85 - 85)  BP: 130/75 (09-24-24 @ 13:33) (130/75 - 130/75)  RR: 18 (09-24-24 @ 13:33) (18 - 18)  SpO2: 98% (09-24-24 @ 13:33) (98% - 98%)    Physical Examination: INCOMPLETE  General - non-toxic appearing male/female in no acute distress  Cardiovascular - peripheral pulses palpable, no edema  Respiratory - breathing comfortably with no increased work of breathing    Neurologic Exam:  Mental status - Awake, Alert, Oriented to person, place (hospital), and time. Speech fluent, repetition and naming intact. Follows simple and complex commands. Attention/concentration, recent and remote memory (including registration 3/3 and recall 3/3), and fund of knowledge intact    Cranial nerves - PERRLA (4mm -> 3mm b/l), VFF (visual acuity 20/20 b/l, no nystagmus, no dysconjugate gaze), EOMI, face sensation (V1-V3) intact b/l, facial strength intact without asymmetry b/l, hearing intact b/l, palate with symmetric elevation, trapezius 5/5 strength b/l, tongue midline on protrusion with full lateral movement    Motor - Normal bulk and tone throughout. No pronator drift.    Strength testing            Deltoid      Biceps      Triceps     Wrist Extension    Wrist Flexion     Interossei         R            5                 5               5                     5                              5                        5                 5  L             5                 5               5                     5                              5                        5                 5              Hip Flexion    Hip Extension    Knee Flexion    Knee Extension    Dorsiflexion    Plantar Flexion  R              5                         5                       5                           5                            5                          5  L              5                         5                        5                           5                            5                          5    Sensation - Light touch/temperature OR pain/vibration intact throughout    DTR's -             Biceps      Triceps     Brachioradialis      Patellar    Ankle    Toes/plantar response  R             2+             2+                  2+                       2+            2+                 Down  L              2+             2+                 2+                        2+           2+                 Down    Coordination - Finger to Nose intact b/l. Heel to Swain intact b/l. No tremors appreciated    Gait and station - Normal casual gait. Romberg (-)    Labs:                        13.9   4.28  )-----------( 131      ( 24 Sep 2024 13:51 )             40.8     09-24    140  |  106  |  16  ----------------------------<  98  3.8   |  21[L]  |  0.97    Ca    10.1      24 Sep 2024 13:51    TPro  8.1  /  Alb  4.2  /  TBili  0.4  /  DBili  x   /  AST  13  /  ALT  14  /  AlkPhos  82  09-24    CAPILLARY BLOOD GLUCOSE      POCT Blood Glucose.: 100 mg/dL (24 Sep 2024 13:32)    LIVER FUNCTIONS - ( 24 Sep 2024 13:51 )  Alb: 4.2 g/dL / Pro: 8.1 g/dL / ALK PHOS: 82 U/L / ALT: 14 U/L / AST: 13 U/L / GGT: x           PT/INR - ( 24 Sep 2024 13:51 )   PT: 12.9 sec;   INR: 1.08 ratio       PTT - ( 24 Sep 2024 13:51 )  PTT:32.1 sec          Radiology:     Neurology - Consult Note    Spectra: [phone] ([facility]), [phone] ([facility])    HPI:  89F R-handed PMHx HTN, HLD, b/l lower extremity DVT (previously on Xarelto then Eliquis) presenting with lightheadedness/dizziness. Pt states on Monday, 9/23/24, pt was sitting in her chair around 3pm getting ready to make lunch. Pt states she felt very "dizzy like lightheaded" like she was going to "pass out." Pt states that the room was cool but "she felt hot" where she had to fan herself. Pt denies LOC. Pt notes that she takes amlodipine 5mg and metoprolol ER 25mg daily and she has been compliant with it. Pt also notes that 2 weeks prior to this episode, she had an episode of "room spinning sensation" for several minutes when she woke up from sleep. Completely resolved without intervention. Denies fever, chills, change in vision or hearing, neck pain, back pain, numbness/tingling or weakness.     Review of Systems:   CONSTITUTIONAL: No fevers or chills  EYES AND ENT: No visual changes or no throat pain   NECK: No pain or stiffness  RESPIRATORY: No shortness of breath  CARDIOVASCULAR: No chest pain or palpitations  GASTROINTESTINAL: No nausea or vomiting   GENITOURINARY: No dysuria  NEUROLOGICAL: +As stated in HPI above  SKIN: No itching, burning, rashes, or lesions   All other review of systems is negative unless indicated above.    Allergies:  No Known Allergies    PMHx/PSHx/Family Hx: As above, otherwise see below   HTN (hypertension)    Hypothyroid    Osteoporosis    Pulmonary embolism    Uric acid serum decreased    Gout    Hyperthyroidism    Social Hx:  Never smoker; no current use of tobacco, alcohol, or illicit drugs    Medications:  MEDICATIONS  (STANDING):    MEDICATIONS  (PRN):      Vitals:  T(C): 36.9 (09-24-24 @ 14:38), Max: 36.9 (09-24-24 @ 14:38)  HR: 85 (09-24-24 @ 13:33) (85 - 85)  BP: 130/75 (09-24-24 @ 13:33) (130/75 - 130/75)  RR: 18 (09-24-24 @ 13:33) (18 - 18)  SpO2: 98% (09-24-24 @ 13:33) (98% - 98%)    Physical Examination:  General - non-toxic appearing female in no acute distress  Cardiovascular - peripheral pulses palpable, no edema  Respiratory - breathing comfortably with no increased work of breathing    Neurologic Exam:  Mental status - Awake, Alert, Oriented to person, place (hospital), and time (September 2024). Speech fluent, repetition and naming intact. Follows simple and complex commands.     Cranial nerves - PERRLA (4mm -> 3mm b/l), VFF (?Right beating nystagmus which is fatigable, no dysconjugate gaze), EOMI, face sensation (V1-V3) intact b/l, facial strength intact without asymmetry b/l, hearing intact b/l, palate with symmetric elevation, trapezius 5/5 strength b/l, tongue midline on protrusion with full lateral movement    Motor - Normal bulk and tone throughout. No pronator drift.    Strength testing            Deltoid      Biceps      Triceps       R            5              5               5            5                               L             5              5               5            5                                           Hip Flexion    nee Flexion    Knee Extension    Dorsiflexion    Plantar Flexion  R              5                 5                       5                           5                     5                            L              5                 5                        5                           5                     5                             Sensation - Light touch intact throughout    DTR's - pectoralis reflex+             Biceps      Triceps     Brachioradialis      Patellar    Ankle    Toes/plantar response  R             3+          2+              2+                    2+           2+                 mute   L              3+          2+             2+                     2+           2+                 mute    Coordination - Finger to Nose intact b/l. Heel to Swain intact b/l. No tremors appreciated    Gait and station - pt refused to walk      Labs:                        13.9   4.28  )-----------( 131      ( 24 Sep 2024 13:51 )             40.8     09-24    140  |  106  |  16  ----------------------------<  98  3.8   |  21[L]  |  0.97    Ca    10.1      24 Sep 2024 13:51    TPro  8.1  /  Alb  4.2  /  TBili  0.4  /  DBili  x   /  AST  13  /  ALT  14  /  AlkPhos  82  09-24    CAPILLARY BLOOD GLUCOSE  POCT Blood Glucose.: 100 mg/dL (24 Sep 2024 13:32)    LIVER FUNCTIONS - ( 24 Sep 2024 13:51 )  Alb: 4.2 g/dL / Pro: 8.1 g/dL / ALK PHOS: 82 U/L / ALT: 14 U/L / AST: 13 U/L / GGT: x           PT/INR - ( 24 Sep 2024 13:51 )   PT: 12.9 sec;   INR: 1.08 ratio       PTT - ( 24 Sep 2024 13:51 )  PTT:32.1 sec      Radiology:

## 2025-06-14 ENCOUNTER — INPATIENT (INPATIENT)
Facility: HOSPITAL | Age: 89
LOS: 1 days | Discharge: HOME | End: 2025-06-16
Attending: HOSPITALIST | Admitting: HOSPITALIST
Payer: MEDICARE

## 2025-06-14 VITALS
DIASTOLIC BLOOD PRESSURE: 80 MMHG | SYSTOLIC BLOOD PRESSURE: 138 MMHG | RESPIRATION RATE: 16 BRPM | HEART RATE: 73 BPM | OXYGEN SATURATION: 100 % | WEIGHT: 117.07 LBS | TEMPERATURE: 98 F

## 2025-06-14 DIAGNOSIS — R42 DIZZINESS AND GIDDINESS: ICD-10-CM

## 2025-06-14 DIAGNOSIS — Z98.89 OTHER SPECIFIED POSTPROCEDURAL STATES: Chronic | ICD-10-CM

## 2025-06-14 LAB
ALBUMIN SERPL ELPH-MCNC: 4.4 G/DL — SIGNIFICANT CHANGE UP (ref 3.3–5)
ALP SERPL-CCNC: 91 U/L — SIGNIFICANT CHANGE UP (ref 40–120)
ALT FLD-CCNC: 16 U/L — SIGNIFICANT CHANGE UP (ref 4–33)
ANION GAP SERPL CALC-SCNC: 15 MMOL/L — HIGH (ref 7–14)
AST SERPL-CCNC: 15 U/L — SIGNIFICANT CHANGE UP (ref 4–32)
BASOPHILS # BLD AUTO: 0.02 K/UL — SIGNIFICANT CHANGE UP (ref 0–0.2)
BASOPHILS NFR BLD AUTO: 0.6 % — SIGNIFICANT CHANGE UP (ref 0–2)
BILIRUB SERPL-MCNC: 0.4 MG/DL — SIGNIFICANT CHANGE UP (ref 0.2–1.2)
BUN SERPL-MCNC: 14 MG/DL — SIGNIFICANT CHANGE UP (ref 7–23)
CALCIUM SERPL-MCNC: 10.4 MG/DL — SIGNIFICANT CHANGE UP (ref 8.4–10.5)
CHLORIDE SERPL-SCNC: 105 MMOL/L — SIGNIFICANT CHANGE UP (ref 98–107)
CO2 SERPL-SCNC: 21 MMOL/L — LOW (ref 22–31)
CREAT SERPL-MCNC: 0.92 MG/DL — SIGNIFICANT CHANGE UP (ref 0.5–1.3)
EGFR: 56 ML/MIN/1.73M2 — LOW
EGFR: 56 ML/MIN/1.73M2 — LOW
EOSINOPHIL # BLD AUTO: 0.18 K/UL — SIGNIFICANT CHANGE UP (ref 0–0.5)
EOSINOPHIL NFR BLD AUTO: 5.3 % — SIGNIFICANT CHANGE UP (ref 0–6)
GLUCOSE SERPL-MCNC: 97 MG/DL — SIGNIFICANT CHANGE UP (ref 70–99)
HCT VFR BLD CALC: 42.6 % — SIGNIFICANT CHANGE UP (ref 34.5–45)
HGB BLD-MCNC: 14.7 G/DL — SIGNIFICANT CHANGE UP (ref 11.5–15.5)
IANC: 1.85 K/UL — SIGNIFICANT CHANGE UP (ref 1.8–7.4)
IMM GRANULOCYTES NFR BLD AUTO: 0.3 % — SIGNIFICANT CHANGE UP (ref 0–0.9)
LYMPHOCYTES # BLD AUTO: 0.98 K/UL — LOW (ref 1–3.3)
LYMPHOCYTES # BLD AUTO: 29 % — SIGNIFICANT CHANGE UP (ref 13–44)
MAGNESIUM SERPL-MCNC: 2.4 MG/DL — SIGNIFICANT CHANGE UP (ref 1.6–2.6)
MCHC RBC-ENTMCNC: 32.7 PG — SIGNIFICANT CHANGE UP (ref 27–34)
MCHC RBC-ENTMCNC: 34.5 G/DL — SIGNIFICANT CHANGE UP (ref 32–36)
MCV RBC AUTO: 94.7 FL — SIGNIFICANT CHANGE UP (ref 80–100)
MONOCYTES # BLD AUTO: 0.34 K/UL — SIGNIFICANT CHANGE UP (ref 0–0.9)
MONOCYTES NFR BLD AUTO: 10.1 % — SIGNIFICANT CHANGE UP (ref 2–14)
NEUTROPHILS # BLD AUTO: 1.85 K/UL — SIGNIFICANT CHANGE UP (ref 1.8–7.4)
NEUTROPHILS NFR BLD AUTO: 54.7 % — SIGNIFICANT CHANGE UP (ref 43–77)
NRBC # BLD AUTO: 0 K/UL — SIGNIFICANT CHANGE UP (ref 0–0)
NRBC # FLD: 0 K/UL — SIGNIFICANT CHANGE UP (ref 0–0)
NRBC BLD AUTO-RTO: 0 /100 WBCS — SIGNIFICANT CHANGE UP (ref 0–0)
PHOSPHATE SERPL-MCNC: 2.8 MG/DL — SIGNIFICANT CHANGE UP (ref 2.5–4.5)
PLATELET # BLD AUTO: 136 K/UL — LOW (ref 150–400)
POTASSIUM SERPL-MCNC: 3.8 MMOL/L — SIGNIFICANT CHANGE UP (ref 3.5–5.3)
POTASSIUM SERPL-SCNC: 3.8 MMOL/L — SIGNIFICANT CHANGE UP (ref 3.5–5.3)
PROT SERPL-MCNC: 8.2 G/DL — SIGNIFICANT CHANGE UP (ref 6–8.3)
RBC # BLD: 4.5 M/UL — SIGNIFICANT CHANGE UP (ref 3.8–5.2)
RBC # FLD: 13 % — SIGNIFICANT CHANGE UP (ref 10.3–14.5)
SODIUM SERPL-SCNC: 141 MMOL/L — SIGNIFICANT CHANGE UP (ref 135–145)
WBC # BLD: 3.38 K/UL — LOW (ref 3.8–10.5)
WBC # FLD AUTO: 3.38 K/UL — LOW (ref 3.8–10.5)

## 2025-06-14 PROCEDURE — 99497 ADVNCD CARE PLAN 30 MIN: CPT | Mod: 25

## 2025-06-14 PROCEDURE — 70498 CT ANGIOGRAPHY NECK: CPT | Mod: 26

## 2025-06-14 PROCEDURE — 99223 1ST HOSP IP/OBS HIGH 75: CPT

## 2025-06-14 PROCEDURE — 99285 EMERGENCY DEPT VISIT HI MDM: CPT | Mod: GC

## 2025-06-14 PROCEDURE — 70496 CT ANGIOGRAPHY HEAD: CPT | Mod: 26

## 2025-06-14 RX ORDER — MECLIZINE HCL 12.5 MG
25 TABLET ORAL ONCE
Refills: 0 | Status: COMPLETED | OUTPATIENT
Start: 2025-06-14 | End: 2025-06-14

## 2025-06-14 RX ORDER — METOCLOPRAMIDE HCL 10 MG
10 TABLET ORAL ONCE
Refills: 0 | Status: COMPLETED | OUTPATIENT
Start: 2025-06-14 | End: 2025-06-14

## 2025-06-14 RX ADMIN — Medication 10 MILLIGRAM(S): at 23:45

## 2025-06-14 RX ADMIN — Medication 10 MILLIGRAM(S): at 17:06

## 2025-06-14 RX ADMIN — Medication 25 MILLIGRAM(S): at 14:09

## 2025-06-14 RX ADMIN — Medication 1000 MILLILITER(S): at 14:01

## 2025-06-14 NOTE — ED ADULT TRIAGE NOTE - BEFAST SPEECH SLURRED
DISPLAY PLAN FREE TEXT DISPLAY PLAN FREE TEXT DISPLAY PLAN FREE TEXT DISPLAY PLAN FREE TEXT DISPLAY PLAN FREE TEXT DISPLAY PLAN FREE TEXT DISPLAY PLAN FREE TEXT DISPLAY PLAN FREE TEXT DISPLAY PLAN FREE TEXT DISPLAY PLAN FREE TEXT DISPLAY PLAN FREE TEXT DISPLAY PLAN FREE TEXT DISPLAY PLAN FREE TEXT DISPLAY PLAN FREE TEXT DISPLAY PLAN FREE TEXT DISPLAY PLAN FREE TEXT No

## 2025-06-14 NOTE — ED PROVIDER NOTE - OBJECTIVE STATEMENT
89y/o female pmh of htn, hld, hyperthyrodism,   dizziness x 5 days, worse w/ positional changes. See MDM

## 2025-06-14 NOTE — H&P ADULT - NSHPLABSRESULTS_GEN_ALL_CORE
14.7   3.38  )-----------( 136      ( 14 Jun 2025 14:08 )             42.6     141  |  105  |  14  ----------------------------<  97     06-14  3.8   |  21[L]  |  0.92    Ca    10.4      14 Jun 2025 14:08  Phos  2.8     06-14  Mg     2.40     06-14    TPro  8.2  /  Alb  4.4  /  TBili  0.4  /  DBili  x   /  AST  15  /  ALT  16  /  AlkPhos  91  06-14    < from: CT Angio Head w/ IV Cont (06.14.25 @ 18:19) >/< from: CT Angio Neck w/ IV Cont (06.14.25 @ 18:19) >  CT BRAIN:  VENTRICLES AND SULCI: Age-appropriate involutional changes.   INTRA-AXIAL: No mass effect, acute hemorrhage, or midline shift.  There are periventricular and subcortical white matter hypodensities, consistent with microvascular type changes. Cortical atrophy.  EXTRA-AXIAL: No mass or fluid collection. Basal cisterns are normal in appearance. Enlarged sella turcica.  VISUALIZED SINUSES:  Clear.  TYMPANOMASTOID CAVITIES:  Mild ethmoid and frontal sinus mucosal thickening.  VISUALIZED ORBITS: Normal.  CALVARIUM: Intact.  MISCELLANEOUS: None.  CTA NECK:  AORTIC ARCH AND VISUALIZED GREAT VESSELS: Within normal limits.  RIGHT:  COMMON CAROTID ARTERY: No significant stenosis to the carotid bifurcation. Calcified plaque at the right carotid bifurcation and proximal ICA with less than 50% stenosis.  INTERNAL CAROTID ARTERY: No significant stenosis based on NASCET criteria.  VERTEBRAL ARTERY: Normal in course and caliber to the intracranial circulation.  LEFT:  COMMON CAROTID ARTERY: No significant stenosis to the carotid bifurcation. Left common carotid calcified plaque with less than 50% stenosis.  INTERNAL CAROTID ARTERY: No significant stenosis based on NASCET criteria.  VERTEBRAL ARTERY: Normal in course and caliber to the intracranial circulation.  VISUALIZED LUNGS: Clear.  MISCELLANEOUS: Enlarged heterogenous multinodular thyroid.  CAROTID STENOSIS REFERENCE: Percent (%) stenosis is expressed in terms of NASCET Criteria. (NASCET = 100x1-(N/D)). N=greatest narrowing. D=normal distal diameter - MILD = <50% stenosis. - MODERATE = 50-69% stenosis. - SEVERE = 70-89% stenosis. - HAIRLINE/CRITICAL = 90-99% stenosis. - OCCLUDED = 100% stenosis.  CTA HEAD:  INTERNAL CAROTID ARTERIES: Calcified plaque throughout the bilateral intracranial internal carotid arteries. Bilateral petrous, precavernous, cavernous, and supraclinoid regions are patent without significant stenosis. 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  [place] OF DOVE: No aneurysm identified. Tiny aneurysms can be beyond the resolution of CTA technique.  ANTERIOR CEREBRAL ARTERIES: No significant stenosis or occlusion.  MIDDLE CEREBRAL ARTERIES: No significant stenosis or occlusion.  POSTERIOR CEREBRAL ARTERIES: No significant stenosis or occlusion.  DISTAL VERTEBRAL / BASILAR ARTERIES: No significant stenosis or occlusion.  VENOUS STRUCTURES: Inadequate venous phase opacification.  MISCELLANEOUS: No other vascular abnormality is seen.  IMPRESSION:   CT HEAD: No acute intracranial hemorrhage, mass effect, or midline shift.  CTA NECK: No evidence of significant stenosis or occlusion. Enlarged heterogenous multinodular thyroid.  CTA HEAD: No large vessel occlusion or significant stenosis. 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    EKG personally reviewed and interpreted - NSR 68bpm, QTc 459ms

## 2025-06-14 NOTE — ED PROVIDER NOTE - ATTENDING CONTRIBUTION TO CARE
I have discussed the patient's case presentation with resident. I have also personally performed a face-to-face evaluation of the patient. I agree with the resident's assessment and plan. My additional comments are as below:    89 yo F p/w vertiginous dizziness, worse w/ positional changes x 5 days without any other associated sx. No facial droop, no dysarthria, no extremity weakness, no word finding difficulty, not altered mental status. Physical exam today significant for multidirectional nystagmus, new when compared to last Neuro note which only reported right-beating nystagmus. NIHSS 0 on my exam (multi-directional nystagmus but no gaze limitations). Will pursue CTA due to concern for cerebellar stroke but will require admission and neuro consult for MRI for better eval.

## 2025-06-14 NOTE — H&P ADULT - PROBLEM SELECTOR PLAN 1
appreciate neuro recs  MR w/wo contrast pending for eval  VS and neuro checks Q4  meclizine PRN  fall precautions, PT consult   outpatient vestibular rehab   -Post discharge patient can follow at [address] [facility] [place] [address].  Patient can call [phone] to schedule this appointment. If pt is without insurance, patient can follow up with [facility], located in [address] [place] [address] at [address] [place] [address], [place] [address]. Patient/family can call [phone] to schedule this appointment.  PT/OT  c/w asa  check FLP, TSH, A1c with AM labs  check orthostatics  check UA

## 2025-06-14 NOTE — ED ADULT NURSE NOTE - OBJECTIVE STATEMENT
pt reporting to the ED for dizziness since Monday. Reports "light headed". no change or increase with change in position. Pt is AOX4. Pt denies chest pain or SOB. PT respirations even an unlabored. Pt appears to be comfortable, in NAD. Pt denies fever, chills, n/v/d. Pt denies abdominal pain, dysuria, hematuria.

## 2025-06-14 NOTE — H&P ADULT - TIME BILLING
Preparing to see the patient including review of tests and other providers' notes, confirming history with patient/daughter, performing medical examination and evaluation, counseling and educating the patient/daughter, ordering medications, tests and procedures, communicating with other health care professionals, documenting clinical information in the EMR, independently interpreting results and communicating results to the patient/daughter, care coordination

## 2025-06-14 NOTE — H&P ADULT - ASSESSMENT
89 -year-old female with HTN, hyperthyroidism, gout, PAD, history of VTE s/p a/c, presenting from home with 5d of dizziness especially with sitting/standing/ambulating.

## 2025-06-14 NOTE — H&P ADULT - IF HCP IS NOT ON CHART, IDENTIFY THE PERSONS NAME AND PHONE NUMBER CONTACTED TO BRING IN DOCUMENT
patient could not recall if she had every filled out a proxy but stated it would be her daughters, [place] states she will bring in copy of proxy tomorrow

## 2025-06-14 NOTE — H&P ADULT - CONVERSATION DETAILS
Discussed advanced care planning and reasoning for discussion with patient and daughter at bedside. Patient could not recall if she had every signed a proxy but Andreina Prince, at bedside, reports she is the proxy. Patient states she would have made her daughters her proxy, Andreina to bring in copy of form in AM, will need to f/u. Discussed cardiopulmonary resuscitation with patient and daughter and reasoning for discussion, patient notes this has never been discussed with her in the past, she will need to think about it, MOLST form provided to patient and discussed items, patient understands and is amenable to remaining FULL CODE while she is thinking it through further.

## 2025-06-14 NOTE — ED ADULT TRIAGE NOTE - CHIEF COMPLAINT QUOTE
Pt complains of dizziness since Monday. States it worse when changing positions. Denies CP, SOB, HA. Ambulatory with cane.

## 2025-06-14 NOTE — CONSULT NOTE ADULT - ASSESSMENT
INCOMPLETE     Impression:      Recommendations:           WIL MEYRE is a 89y (05-May-1927) R handed woman with PMH of HTN, hyperthyroidism, b/l lower extremity DVT (previously on Xarelto then Eliquis, completed 6 months therapy with neg US), who presents with 5 days of intermittent dizziness.      LKW: 6/9/25  NIHSS: 0  Baseline mRS: 3    LKW: 6/9/25  NIHSS: 0  Baseline mRS: 3  Not a candidate for tenecteplase or thrombectomy due to out of window     Impression:  Episodic dizziness likely 2/2 peripheral vestibular dysfunction, suspect BPPV. Low suspicion for acute ischemic infarct     Recommendations:    -Symptomatic management with meclizine 12.5mg TID PRN (up to 1 week)  -Zofran for nausea  -Orthostatic vitals  -If symptoms do not improve, can obtain MR brain w/o to r/o infarct  -Would consider PT/OT eval if cannot ambulate, and inpatient [facility]   -Continue home aspirin   -BP goal: Normotension 130/80  -Outpatient vestibular rehab   -ENT follow up outpatient   -Post discharge patient can follow at [address] [place] [address]. [address] [address].  Patient can call [phone] to schedule this appointment.  If pt is without insurance, patient can follow up with [facility], located in [address] [place] [address] at [address] [place] [id number]. Patient/family can call [phone] to schedule this appointment.        Case discussed with stroke fellow Dr. Kirk. If remains in hospital, will be seen by Neurology attending Dr. Schoenberg

## 2025-06-14 NOTE — ED PROVIDER NOTE - CLINICAL SUMMARY MEDICAL DECISION MAKING FREE TEXT BOX
89y/o female pmh of htn, hld, hyperthyrodism, presents w/ dizziness x 5 days, worse w/ positional changes. patient seen in last yr w/ similar complaints, negative CTAs was recommended mri and neuro outpatient f/u but never followed up. patient states she felt improved after fluids on initial visit several months ago. no systemic signs or symptoms, no recent fall. seen at bedside w/ family. pe: aox3, lungs CTA, cardiac regular rate, abd soft non tender, LE w/o swelling, cn 2-13 grossly intact, - finger to nose + right sided, leftsided nystagmus, upward and lower beating nystagmus. neuro note from earlier in the year only notices right sided nystagmus will get CTAs for new findings, will get screening labs, fluids, meds and re-eval. will require neuro consult after imaging 89y/o female pmh of htn, hld, hyperthyroidism, presents w/ dizziness x 5 days, worse w/ positional changes. patient seen in last yr w/ similar complaints, negative CTAs was recommended mri and neuro outpatient f/u but never followed up. patient states she felt improved after fluids on initial visit several months ago. no systemic signs or symptoms, no recent fall. seen at bedside w/ family. pe: aox3, lungs CTA, cardiac regular rate, abd soft non tender, LE w/o swelling, cn 2-12 grossly intact, normal finger to nose exam. +multidirectional nystagmus including, left, right, up, and down. neuro note from earlier in the year only notices right sided nystagmus will get CTAs for new findings, will get screening labs, fluids, meds and re-eval. will require neuro consult after imaging

## 2025-06-14 NOTE — ED ADULT NURSE NOTE - NSFALLRISKINTERV_ED_ALL_ED

## 2025-06-14 NOTE — ED ADULT NURSE REASSESSMENT NOTE - NS ED NURSE REASSESS COMMENT FT1
BREAK COVERAGE RN. Patient A&Ox4 ambulatory with cane to restroom, steady gait observed, respirations even and unlabored. Patient placed back on cardiac monitor-NS. Vitals as noted. Patient awaiting CT results. Stretcher in lowest position, wheels locked, appropriate side rails in place, patient in view of RN station.

## 2025-06-14 NOTE — CONSULT NOTE ADULT - ATTENDING COMMENTS
Episodic dizziness likely 2/2 peripheral vestibular dysfunction, suspect BPPV. Low suspicion for acute ischemic infarct     PT meclizine

## 2025-06-14 NOTE — H&P ADULT - HISTORY OF PRESENT ILLNESS
89 -year-old female with HTN, hyperthyroidism, gout, PAD, history of VTE s/p a/c, presenting from home with 5d of dizziness especially with sitting/standing/ambulating. Denies room-spinning sensation, reports feeling like she's going to fall. Notes fall on Tuesday onto buttocks, denies head trauma/LOC or pain after fall. Ambulates with cane at baseline. No headaches. Patient had admission in 2021 with dizziness thought to be secondary to orthostatic hypotension, BP med was discontinued at t that time. Came to ED 9/2024 with dizziness, seen by neuro and discharged home, did not have outpt f/u with a neurologist.    In the ED VS:  98  60-73  138/167/80-82  16-17  100%RA, received NS 1L IVF, meclizine 25mg PO x1 and metoclopramide 10mg IV x2

## 2025-06-14 NOTE — H&P ADULT - NSICDXPASTMEDICALHX_GEN_ALL_CORE_FT
PAST MEDICAL HISTORY:  Gout     History of DVT of lower extremity     HTN (hypertension)     Hyperthyroidism     Osteoporosis     Pulmonary embolism

## 2025-06-14 NOTE — CONSULT NOTE ADULT - SUBJECTIVE AND OBJECTIVE BOX
Neurology - Consult Note    -  Spectra: [phone] ([facility]), [phone] ([facility])  -    HPI: Patient WIL MEYER is a 89y (05-May-1927) wo/man with a PMHx significant for ***      Review of Systems:    All other review of systems is negative unless indicated above.    Allergies:  No Known Allergies      PMHx/PSHx/Family Hx: As above, otherwise see below   HTN (hypertension)  Hypothyroid  Osteoporosis  Pulmonary embolism  Uric acid serum decreased  Gout  Hyperthyroidism    Social Hx:  No current use of tobacco, alcohol, or illicit drugs      Medications:  MEDICATIONS  (STANDING):    MEDICATIONS  (PRN):      Vitals:  T(C): 36.5 (06-14-25 @ 18:03), Max: 36.7 (06-14-25 @ 12:44)  HR: 68 (06-14-25 @ 18:03) (60 - 73)  BP: 167/80 (06-14-25 @ 18:03) (138/80 - 167/80)  RR: 17 (06-14-25 @ 18:03) (16 - 17)  SpO2: 100% (06-14-25 @ 18:03) (100% - 100%)    Physical Examination: INCOMPLETE  General - NAD  Cardiovascular - Peripheral pulses palpable, no edema  Eyes - Fundoscopy with flat, sharp optic discs and no hemorrhage or exudates; Fundoscopy not well visualized; Fundoscopy not performed due to safety precautions in the setting of the COVID-19 pandemic    Neurologic Exam:  Mental status - Awake, Alert, Oriented to person, place, and time. Speech fluent, repetition and naming intact. Follows simple and complex commands. Attention/concentration, recent and remote memory (including registration and recall), and fund of knowledge intact    Cranial nerves - PERRLA, VFF, EOMI, face sensation (V1-V3) intact b/l, facial strength intact without asymmetry b/l, hearing intact b/l, palate with symmetric elevation, trapezius OR sternocleidomastiod 5/5 strength b/l, tongue midline on protrusion with full lateral movement    Motor - Normal bulk and tone throughout. No pronator drift.  Strength testing            Deltoid      Biceps      Triceps     Wrist Extension    Wrist Flexion     Interossei         R            5                 5               5                     5                              5                        5                 5  L             5                 5               5                     5                              5                        5                 5              Hip Flexion    Hip Extension    Knee Flexion    Knee Extension    Dorsiflexion    Plantar Flexion  R              5                           5                       5                           5                            5                          5  L              5                           5                        5                           5                            5                          5    Sensation - Light touch/temperature OR pain/vibration intact throughout    DTR's -             Biceps      Triceps     Brachioradialis      Patellar    Ankle    Toes/plantar response  R             2+             2+                  2+                       2+            2+                 Down  L              2+             2+                 2+                        2+           2+                 Down    Coordination - Finger to Nose intact b/l. No tremors appreciated    Gait and station - Normal casual gait. Romberg (-)    Labs:                        14.7   3.38  )-----------( 136      ( 14 Jun 2025 14:08 )             42.6     06-14    141  |  105  |  14  ----------------------------<  97  3.8   |  21[L]  |  0.92    Ca    10.4      14 Jun 2025 14:08  Phos  2.8     06-14  Mg     2.40     06-14    TPro  8.2  /  Alb  4.4  /  TBili  0.4  /  DBili  x   /  AST  15  /  ALT  16  /  AlkPhos  91  06-14    CAPILLARY BLOOD GLUCOSE        LIVER FUNCTIONS - ( 14 Jun 2025 14:08 )  Alb: 4.4 g/dL / Pro: 8.2 g/dL / ALK PHOS: 91 U/L / ALT: 16 U/L / AST: 15 U/L / GGT: x                       Radiology:     Neurology - Consult Note    -  Spectra: [phone] ([facility]), [phone] ([facility])  -    HPI: Patient WIL MEYER is a 89y (05-May-1927) R handed woman with PMH of HTN, hyperthyroidism, b/l lower extremity DVT (previously on Xarelto then Eliquis, completed 6 months therapy with neg US), who presents with 5 days of intermittent dizziness.      Patient reports intermittent dizziness since Monday 6/9. She denies a room spinning sensation, reports she feels lightheaded.  Dizziness is worse with movement and when ambulating, improves with rest. On Tuesday she fell while walking to the bathroom but denies any headstrike or LOC. Wednesday and Thursday she felt better without dizziness but dizziness returned on Friday. She lives with her two daughters and ambulates with a cane. Patient denies any ear pain, tinnitus, hearing loss, or aural fullness. She takes aspirin 81mg daily at home. Denies any infectious symptoms or recent changes in medications. Denies any poor PO intake. She has presented with dizziness in Sept 2024, thought to be orthostatic at the time. Symptoms improved. Patient has not seen a neurologist, and did not get an MR brain at the time.     LKW: 6/9/25  NIHSS: 0  Baseline mRS: 3    Review of Systems:    All other review of systems is negative unless indicated above.    Allergies:  No Known Allergies      PMHx/PSHx/Family Hx: As above, otherwise see below   HTN (hypertension)  Hypothyroid  Osteoporosis  Pulmonary embolism  Uric acid serum decreased  Gout  Hyperthyroidism    Social Hx:  No current use of tobacco, alcohol, or illicit drugs      Medications:  MEDICATIONS  (STANDING):    MEDICATIONS  (PRN):      Vitals:  T(C): 36.5 (06-14-25 @ 18:03), Max: 36.7 (06-14-25 @ 12:44)  HR: 68 (06-14-25 @ 18:03) (60 - 73)  BP: 167/80 (06-14-25 @ 18:03) (138/80 - 167/80)  RR: 17 (06-14-25 @ 18:03) (16 - 17)  SpO2: 100% (06-14-25 @ 18:03) (100% - 100%)    Physical Examination:   General - NAD, lying supine  Head impulse- corrective saccade with right head turn   Nystagmus- horizontal left beating nystagmus (more prominent left beating nystagmus on R gaze).   Test of skew: negative     Neurologic Exam:  Mental status - Awake, Alert, Oriented to person, place, and (month/year). Able to state age. Speech fluent, repetition and naming intact. Follows 2 step crossed commands. Knows current president   Cranial nerves - PERRL, VFF, EOMI, face sensation (V1-V3) intact b/l, facial strength intact without asymmetry b/l (subtle R ptosis). Hearing intact b/l to finger rub, trapezius 5/5 strength b/l, tongue midline on protrusion   Motor - Normal bulk and tone throughout. No pronator drift. Strength testing grossly 5/5  Sensation - Light touch intact throughout  DTR's -Toes mute B/L  Coordination - Finger to Nose and heel to shin intact b/l  Gait and station - Able to stand from sitting position independently with cane, short stepped hesitant gait. No lateropulsion noted     Labs:                        14.7   3.38  )-----------( 136      ( 14 Jun 2025 14:08 )             42.6     06-14    141  |  105  |  14  ----------------------------<  97  3.8   |  21[L]  |  0.92    Ca    10.4      14 Jun 2025 14:08  Phos  2.8     06-14  Mg     2.40     06-14    TPro  8.2  /  Alb  4.4  /  TBili  0.4  /  DBili  x   /  AST  15  /  ALT  16  /  AlkPhos  91  06-14    CAPILLARY BLOOD GLUCOSE        LIVER FUNCTIONS - ( 14 Jun 2025 14:08 )  Alb: 4.4 g/dL / Pro: 8.2 g/dL / ALK PHOS: 91 U/L / ALT: 16 U/L / AST: 15 U/L / GGT: x               Radiology:    6/14/25  CT HEAD:  No acute intracranial hemorrhage, mass effect, or midline shift.    CTA NECK:  No evidence of significant stenosis or occlusion.  Enlarged heterogenous multinodular thyroid.    CTA HEAD:  No large vessel occlusion or significant stenosis.  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

## 2025-06-14 NOTE — ED PROVIDER NOTE - PROGRESS NOTE DETAILS
Received sign out from day team. Spoke with neuro who recommends admission for mri if unable to ambulate and still symptotic. Patient unable to ambulate, will admit. Nathalia Abdi DO (PGY-2)

## 2025-06-14 NOTE — H&P ADULT - NSHPPHYSICALEXAM_GEN_ALL_CORE
Vital Signs Last 24 Hrs  T(C): 36.4 (14 Jun 2025 22:00), Max: 36.7 (14 Jun 2025 12:44)  T(F): 97.6 (14 Jun 2025 22:00), Max: 98 (14 Jun 2025 12:44)  HR: 81 (14 Jun 2025 22:00) (60 - 81)  BP: 139/88 (14 Jun 2025 22:00) (138/80 - 167/80)  RR: 16 (14 Jun 2025 22:00) (16 - 17)  SpO2: 100% (14 Jun 2025 22:00) (100% - 100%)    Parameters below as of 14 Jun 2025 22:00  Patient On (Oxygen Delivery Method): room air    PHYSICAL EXAM:  GENERAL: NAD  HEAD:  Atraumatic, Normocephalic  EYES: EOMI, PERRL, conjunctiva and sclera clear  NECK: Supple, No JVD  CHEST/LUNG: Clear to auscultation bilaterally; No wheezes, rales or rhonchi; normal work of breathing, speaking in full sentences  HEART: Regular rate and rhythm; No murmurs, rubs, or gallops, (+)S1, S2  ABDOMEN: Soft, Nontender, Nondistended; Normal Bowel sounds   EXTREMITIES:  1+ Peripheral Pulses, No clubbing, cyanosis, or edema  PSYCH: normal mood and affect, A&Ox3  NEUROLOGY: no focal neuro deficits, CN II-XII intact, FTN intact b/l, strength 5/5 x4 extremities, sensation grossly intact; (+)horizontal nystagmus on both left and right gaze  SKIN: No rashes or lesions on limited eval in ED hallway

## 2025-06-15 DIAGNOSIS — E05.90 THYROTOXICOSIS, UNSPECIFIED WITHOUT THYROTOXIC CRISIS OR STORM: ICD-10-CM

## 2025-06-15 DIAGNOSIS — Z29.9 ENCOUNTER FOR PROPHYLACTIC MEASURES, UNSPECIFIED: ICD-10-CM

## 2025-06-15 DIAGNOSIS — R42 DIZZINESS AND GIDDINESS: ICD-10-CM

## 2025-06-15 DIAGNOSIS — D75.89 OTHER SPECIFIED DISEASES OF BLOOD AND BLOOD-FORMING ORGANS: ICD-10-CM

## 2025-06-15 DIAGNOSIS — I73.9 PERIPHERAL VASCULAR DISEASE, UNSPECIFIED: ICD-10-CM

## 2025-06-15 DIAGNOSIS — M10.9 GOUT, UNSPECIFIED: ICD-10-CM

## 2025-06-15 DIAGNOSIS — I10 ESSENTIAL (PRIMARY) HYPERTENSION: ICD-10-CM

## 2025-06-15 DIAGNOSIS — N18.30 CHRONIC KIDNEY DISEASE, STAGE 3 UNSPECIFIED: ICD-10-CM

## 2025-06-15 LAB
A1C WITH ESTIMATED AVERAGE GLUCOSE RESULT: 5.1 % — SIGNIFICANT CHANGE UP (ref 4–5.6)
ANION GAP SERPL CALC-SCNC: 15 MMOL/L — HIGH (ref 7–14)
APPEARANCE UR: CLEAR — SIGNIFICANT CHANGE UP
BASOPHILS # BLD AUTO: 0.03 K/UL — SIGNIFICANT CHANGE UP (ref 0–0.2)
BASOPHILS NFR BLD AUTO: 0.6 % — SIGNIFICANT CHANGE UP (ref 0–2)
BILIRUB UR-MCNC: NEGATIVE — SIGNIFICANT CHANGE UP
BUN SERPL-MCNC: 12 MG/DL — SIGNIFICANT CHANGE UP (ref 7–23)
CALCIUM SERPL-MCNC: 9.9 MG/DL — SIGNIFICANT CHANGE UP (ref 8.4–10.5)
CHLORIDE SERPL-SCNC: 106 MMOL/L — SIGNIFICANT CHANGE UP (ref 98–107)
CHOLEST SERPL-MCNC: 224 MG/DL — HIGH
CO2 SERPL-SCNC: 21 MMOL/L — LOW (ref 22–31)
COLOR SPEC: YELLOW — SIGNIFICANT CHANGE UP
CREAT SERPL-MCNC: 0.74 MG/DL — SIGNIFICANT CHANGE UP (ref 0.5–1.3)
DIFF PNL FLD: NEGATIVE — SIGNIFICANT CHANGE UP
EGFR: 73 ML/MIN/1.73M2 — SIGNIFICANT CHANGE UP
EGFR: 73 ML/MIN/1.73M2 — SIGNIFICANT CHANGE UP
EOSINOPHIL # BLD AUTO: 0.27 K/UL — SIGNIFICANT CHANGE UP (ref 0–0.5)
EOSINOPHIL NFR BLD AUTO: 5.3 % — SIGNIFICANT CHANGE UP (ref 0–6)
ESTIMATED AVERAGE GLUCOSE: 100 — SIGNIFICANT CHANGE UP
GLUCOSE SERPL-MCNC: 97 MG/DL — SIGNIFICANT CHANGE UP (ref 70–99)
GLUCOSE UR QL: NEGATIVE MG/DL — SIGNIFICANT CHANGE UP
HCT VFR BLD CALC: 40.1 % — SIGNIFICANT CHANGE UP (ref 34.5–45)
HDLC SERPL-MCNC: 64 MG/DL — SIGNIFICANT CHANGE UP
HGB BLD-MCNC: 14 G/DL — SIGNIFICANT CHANGE UP (ref 11.5–15.5)
IANC: 3.04 K/UL — SIGNIFICANT CHANGE UP (ref 1.8–7.4)
IMM GRANULOCYTES NFR BLD AUTO: 0.2 % — SIGNIFICANT CHANGE UP (ref 0–0.9)
KETONES UR QL: NEGATIVE MG/DL — SIGNIFICANT CHANGE UP
LDLC SERPL-MCNC: 145 MG/DL — HIGH
LEUKOCYTE ESTERASE UR-ACNC: NEGATIVE — SIGNIFICANT CHANGE UP
LIPID PNL WITH DIRECT LDL SERPL: 145 MG/DL — HIGH
LYMPHOCYTES # BLD AUTO: 1.23 K/UL — SIGNIFICANT CHANGE UP (ref 1–3.3)
LYMPHOCYTES # BLD AUTO: 24.1 % — SIGNIFICANT CHANGE UP (ref 13–44)
MAGNESIUM SERPL-MCNC: 2.1 MG/DL — SIGNIFICANT CHANGE UP (ref 1.6–2.6)
MCHC RBC-ENTMCNC: 33.7 PG — SIGNIFICANT CHANGE UP (ref 27–34)
MCHC RBC-ENTMCNC: 34.9 G/DL — SIGNIFICANT CHANGE UP (ref 32–36)
MCV RBC AUTO: 96.4 FL — SIGNIFICANT CHANGE UP (ref 80–100)
MONOCYTES # BLD AUTO: 0.52 K/UL — SIGNIFICANT CHANGE UP (ref 0–0.9)
MONOCYTES NFR BLD AUTO: 10.2 % — SIGNIFICANT CHANGE UP (ref 2–14)
NEUTROPHILS # BLD AUTO: 3.04 K/UL — SIGNIFICANT CHANGE UP (ref 1.8–7.4)
NEUTROPHILS NFR BLD AUTO: 59.6 % — SIGNIFICANT CHANGE UP (ref 43–77)
NITRITE UR-MCNC: NEGATIVE — SIGNIFICANT CHANGE UP
NONHDLC SERPL-MCNC: 160 MG/DL — HIGH
NRBC # BLD AUTO: 0 K/UL — SIGNIFICANT CHANGE UP (ref 0–0)
NRBC # FLD: 0 K/UL — SIGNIFICANT CHANGE UP (ref 0–0)
NRBC BLD AUTO-RTO: 0 /100 WBCS — SIGNIFICANT CHANGE UP (ref 0–0)
PH UR: 7.5 — SIGNIFICANT CHANGE UP (ref 5–8)
PHOSPHATE SERPL-MCNC: 3 MG/DL — SIGNIFICANT CHANGE UP (ref 2.5–4.5)
PLATELET # BLD AUTO: 133 K/UL — LOW (ref 150–400)
POTASSIUM SERPL-MCNC: 3.3 MMOL/L — LOW (ref 3.5–5.3)
POTASSIUM SERPL-SCNC: 3.3 MMOL/L — LOW (ref 3.5–5.3)
PROT UR-MCNC: 30 MG/DL
RBC # BLD: 4.16 M/UL — SIGNIFICANT CHANGE UP (ref 3.8–5.2)
RBC # FLD: 13.2 % — SIGNIFICANT CHANGE UP (ref 10.3–14.5)
SODIUM SERPL-SCNC: 142 MMOL/L — SIGNIFICANT CHANGE UP (ref 135–145)
SP GR SPEC: 1.02 — SIGNIFICANT CHANGE UP (ref 1–1.03)
TRIGL SERPL-MCNC: 86 MG/DL — SIGNIFICANT CHANGE UP
TSH SERPL-MCNC: 4.06 UIU/ML — SIGNIFICANT CHANGE UP (ref 0.27–4.2)
UROBILINOGEN FLD QL: 0.2 MG/DL — SIGNIFICANT CHANGE UP (ref 0.2–1)
WBC # BLD: 5.1 K/UL — SIGNIFICANT CHANGE UP (ref 3.8–10.5)
WBC # FLD AUTO: 5.1 K/UL — SIGNIFICANT CHANGE UP (ref 3.8–10.5)

## 2025-06-15 PROCEDURE — 99232 SBSQ HOSP IP/OBS MODERATE 35: CPT

## 2025-06-15 RX ORDER — AMLODIPINE BESYLATE 10 MG/1
1 TABLET ORAL
Refills: 0 | DISCHARGE

## 2025-06-15 RX ORDER — BRIMONIDINE TARTRATE 1.5 MG/ML
1 SOLUTION/ DROPS OPHTHALMIC
Refills: 0 | Status: DISCONTINUED | OUTPATIENT
Start: 2025-06-15 | End: 2025-06-16

## 2025-06-15 RX ORDER — ENOXAPARIN SODIUM 100 MG/ML
30 INJECTION SUBCUTANEOUS EVERY 24 HOURS
Refills: 0 | Status: DISCONTINUED | OUTPATIENT
Start: 2025-06-15 | End: 2025-06-16

## 2025-06-15 RX ORDER — ASPIRIN 325 MG
81 TABLET ORAL
Refills: 0 | Status: DISCONTINUED | OUTPATIENT
Start: 2025-06-15 | End: 2025-06-16

## 2025-06-15 RX ORDER — ASPIRIN 325 MG
1 TABLET ORAL
Refills: 0 | DISCHARGE

## 2025-06-15 RX ORDER — CYANOCOBALAMIN 1000 UG/ML
500 INJECTION INTRAMUSCULAR; SUBCUTANEOUS DAILY
Refills: 0 | Status: DISCONTINUED | OUTPATIENT
Start: 2025-06-15 | End: 2025-06-16

## 2025-06-15 RX ORDER — ONDANSETRON HCL/PF 4 MG/2 ML
4 VIAL (ML) INJECTION EVERY 8 HOURS
Refills: 0 | Status: DISCONTINUED | OUTPATIENT
Start: 2025-06-15 | End: 2025-06-16

## 2025-06-15 RX ORDER — MECLIZINE HCL 12.5 MG
12.5 TABLET ORAL THREE TIMES A DAY
Refills: 0 | Status: DISCONTINUED | OUTPATIENT
Start: 2025-06-15 | End: 2025-06-16

## 2025-06-15 RX ORDER — CYANOCOBALAMIN 1000 UG/ML
1 INJECTION INTRAMUSCULAR; SUBCUTANEOUS
Refills: 0 | DISCHARGE

## 2025-06-15 RX ORDER — ATORVASTATIN CALCIUM 80 MG/1
20 TABLET, FILM COATED ORAL AT BEDTIME
Refills: 0 | Status: DISCONTINUED | OUTPATIENT
Start: 2025-06-15 | End: 2025-06-16

## 2025-06-15 RX ORDER — METOPROLOL SUCCINATE 50 MG/1
25 TABLET, EXTENDED RELEASE ORAL DAILY
Refills: 0 | Status: DISCONTINUED | OUTPATIENT
Start: 2025-06-15 | End: 2025-06-15

## 2025-06-15 RX ADMIN — ATORVASTATIN CALCIUM 20 MILLIGRAM(S): 80 TABLET, FILM COATED ORAL at 21:53

## 2025-06-15 RX ADMIN — METOPROLOL SUCCINATE 25 MILLIGRAM(S): 50 TABLET, EXTENDED RELEASE ORAL at 05:13

## 2025-06-15 RX ADMIN — BRIMONIDINE TARTRATE 1 DROP(S): 1.5 SOLUTION/ DROPS OPHTHALMIC at 05:21

## 2025-06-15 RX ADMIN — Medication 75 MILLILITER(S): at 13:39

## 2025-06-15 RX ADMIN — Medication 40 MILLIEQUIVALENT(S): at 08:30

## 2025-06-15 RX ADMIN — CYANOCOBALAMIN 500 MICROGRAM(S): 1000 INJECTION INTRAMUSCULAR; SUBCUTANEOUS at 12:55

## 2025-06-15 RX ADMIN — BRIMONIDINE TARTRATE 1 DROP(S): 1.5 SOLUTION/ DROPS OPHTHALMIC at 17:11

## 2025-06-15 RX ADMIN — ENOXAPARIN SODIUM 30 MILLIGRAM(S): 100 INJECTION SUBCUTANEOUS at 05:13

## 2025-06-15 RX ADMIN — Medication 1000 UNIT(S): at 12:56

## 2025-06-15 RX ADMIN — Medication 100 MILLIGRAM(S): at 08:29

## 2025-06-15 RX ADMIN — Medication 1 APPLICATION(S): at 12:49

## 2025-06-15 NOTE — DIETITIAN INITIAL EVALUATION ADULT - NSPROEDAREADYLEARN_GEN_A_NUR
Please return to the ER if you develop complete numbness of your toes, severe swelling of your foot, worsening pain, or for any other medical concerns.  
none

## 2025-06-15 NOTE — PROVIDER CONTACT NOTE (OTHER) - BACKGROUND
89F PMHx HTN, hyperthyroidism, gout, PAD, history of DVT s/p a/c, presenting from home with 5 days of dizziness when sitting, standing, and ambulating

## 2025-06-15 NOTE — PHYSICAL THERAPY INITIAL EVALUATION ADULT - GAIT DEVIATIONS NOTED, PT EVAL
decreased davie/decreased velocity of limb motion/decreased step length/decreased stride length/decreased weight-shifting ability

## 2025-06-15 NOTE — PHYSICAL THERAPY INITIAL EVALUATION ADULT - ASSISTIVE DEVICE FOR TRANSFER: GAIT, REHAB EVAL
straight cane Patient instructed on use of rolling walker if symptoms persist or worsen./straight cane

## 2025-06-15 NOTE — DIETITIAN INITIAL EVALUATION ADULT - PERTINENT MEDS FT
MEDICATIONS  (STANDING):  allopurinol 100 milliGRAM(s) Oral <User Schedule>  aspirin enteric coated 81 milliGRAM(s) Oral <User Schedule>  atorvastatin 20 milliGRAM(s) Oral at bedtime  brimonidine 0.2% Ophthalmic Solution 1 Drop(s) Both EYES two times a day  chlorhexidine 2% Cloths 1 Application(s) Topical daily  cholecalciferol 1000 Unit(s) Oral daily  cyanocobalamin 500 MICROGram(s) Oral daily  enoxaparin Injectable 30 milliGRAM(s) SubCutaneous every 24 hours  methIMAzole 5 milliGRAM(s) Oral <User Schedule>    MEDICATIONS  (PRN):  meclizine 12.5 milliGRAM(s) Oral three times a day PRN Dizziness  ondansetron Injectable 4 milliGRAM(s) IV Push every 8 hours PRN Nausea and/or Vomiting   98

## 2025-06-15 NOTE — PHYSICAL THERAPY INITIAL EVALUATION ADULT - PERTINENT HX OF CURRENT PROBLEM, REHAB EVAL
89 year old female PMHx HTN, hyperthyroidism, gout, PAD, history of DVT status post a/c, presenting from home with 5 days of dizziness when sitting, standing, and ambulating.  CT HEAD: No acute intracranial hemorrhage, mass effect, or midline shift.

## 2025-06-15 NOTE — PROGRESS NOTE ADULT - PROBLEM SELECTOR PLAN 1
appreciate neuro recs  MR w/wo contrast pending for eval  VS and neuro checks Q4  meclizine PRN  fall precautions, PT recs outpt PT  outpatient vestibular rehab   - Pt reports clinically improved, given 1L NS in ED, will give 1 more liter of NS 75ml/h  -Post discharge patient can follow at [address] [place] [address]. [address] [address].  Patient can call [phone] to schedule this appointment. If pt is without insurance, patient can follow up with [facility], located in [address] [place] [address] at [address] [place] [id number]. Patient/family can call [phone] to schedule this appointment.  PT/OT  c/w asa  1L NS   check orthostatics  Lipid panel reviewed (chol 224, ), start atorvastatin 20mg ; TSH 4.06, A1c 5.1% (wnl)  UA neg appreciate neuro recs, likely BPPV, doubt ischemic event  MR w/wo contrast ordered, however, if symptoms continue to improve can cancel study  VS and neuro checks Q4  meclizine PRN  fall precautions, PT recs outpt PT  outpatient vestibular rehab   - Pt reports clinically improved, given 1L NS in ED, will give 1 more liter of NS 75ml/h  -Post discharge patient can follow at [address] [place] [address]. [address] [address].  Patient can call [phone] to schedule this appointment. If pt is without insurance, patient can follow up with [facility], located in [address] [place] [address] at [address] [place] [id number]. Patient/family can call [phone] to schedule this appointment.  PT/OT  c/w asa  1L NS   check orthostatics  Lipid panel reviewed (chol 224, ), start atorvastatin 20mg ; TSH 4.06, A1c 5.1% (wnl)  UA neg

## 2025-06-15 NOTE — PHYSICAL THERAPY INITIAL EVALUATION ADULT - GENERAL OBSERVATIONS, REHAB EVAL
Patient received ambulating with RN in NAD, +tele, agreeable to participate. Patient cleared to participate by PATY Mello.

## 2025-06-15 NOTE — DIETITIAN INITIAL EVALUATION ADULT - PROBLEM SELECTOR PLAN 1
appreciate neuro recs  MR w/wo contrast pending for eval  VS and neuro checks Q4  meclizine PRN  fall precautions, PT consult   outpatient vestibular rehab   -Post discharge patient can follow at [address] [facility]. [address] [address].  Patient can call [phone] to schedule this appointment. If pt is without insurance, patient can follow up with [facility], located in [address] [place] [address] at [address] [place] [address], [place] [address]. Patient/family can call [phone] to schedule this appointment.  PT/OT  c/w asa  check FLP, TSH, A1c with AM labs  check orthostatics  check UA

## 2025-06-15 NOTE — DIETITIAN INITIAL EVALUATION ADULT - PERTINENT LABORATORY DATA
06-15    142  |  106  |  12  ----------------------------<  97  3.3[L]   |  21[L]  |  0.74    Ca    9.9      15 Davon 2025 05:10  Phos  3.0     06-15  Mg     2.10     06-15    TPro  8.2  /  Alb  4.4  /  TBili  0.4  /  DBili  x   /  AST  15  /  ALT  16  /  AlkPhos  91  06-14  A1C with Estimated Average Glucose Result: 5.1 % (06-15-25 @ 05:10)

## 2025-06-15 NOTE — PATIENT PROFILE ADULT - FALL HARM RISK - HARM RISK INTERVENTIONS
Assistance with ambulation/Assistance OOB with selected safe patient handling equipment/Communicate Risk of Fall with Harm to all staff/Discuss with provider need for PT consult/Monitor gait and stability/Provide patient with walking aids - walker, cane, crutches/Reinforce activity limits and safety measures with patient and family/Reorient to person, place and time as needed/Review medications for side effects contributing to fall risk/Sit up slowly, dangle for a short time, stand at bedside before walking/Tailored Fall Risk Interventions/Toileting schedule using arm’s reach rule for commode and bathroom/Use of alarms - bed, chair and/or voice tab/Visual Cue: Yellow wristband and red socks/Bed in lowest position, wheels locked, appropriate side rails in place/Call bell, personal items and telephone in reach/Instruct patient to call for assistance before getting out of bed or chair/Non-slip footwear when patient is out of bed/[place] to call system/Physically safe environment - no spills, clutter or unnecessary equipment/Purposeful Proactive Rounding/Room/bathroom lighting operational, light cord in reach

## 2025-06-15 NOTE — PHYSICAL THERAPY INITIAL EVALUATION ADULT - ADDITIONAL COMMENTS
Patient lives with her two daughters in a house, unclear if patient has steps to manage (patient is forgetful). Patient uses straight cane for ambulation. Reports recent fall on buttock.     Patient left semi-supine in NAD, +bed alarm, +lines/tubes intact, +call bell. RN made aware of session details.  Vitals stable.

## 2025-06-15 NOTE — PATIENT PROFILE ADULT - NSPROPTRIGHTNOTIFY_GEN_A_NUR
- Holding home metoprolol - Continue home ASA 81 and furosemide 40 oral daily with hold parameters declines

## 2025-06-15 NOTE — DIETITIAN INITIAL EVALUATION ADULT - OTHER INFO
89 y/o female with hx DVT, hyperthyroidism, gout, pulmonary embolism, osteoporosis and HTN admitted with dizziness. Pt reports generally good appetite/PO intake PTA, consumes a regular diet at baseline. Pt confirms NKFA, denies difficulties chewing/swallowing. Pt lives at home with her Dtrs, independent with ADLs PTA. Notable medications PTA per chart inclusive of amlodipine, metoprolol along with Vitamin B 12 and cholecalciferol for micronutrient support. Pt reported a stable wt trend PTA with  lbs and current admit wt of 115 lbs.    Pt reports a fair appetite/PO intake due to her current illness. Pt requires assistance with tray set up but is able to feed self independently. No BMs per flowsheets. Consider bowel regimen as appropriate. Labs notable for elevated LDL/Chol. Although these lab values are elevated - given pt's advanced age, strict dietary restriction/compliance not warranted. Briefly reviewed dietary restrictions with pt with goal of a general, healthy diet with limitation of very salty and very fatty/greasy foods when able. Pt verbalized desire to comply as much as possible. To enhance her oral intake during this admission, offered oral supplementation of Magic Cups (290 kcal, 9 gm protein per 4 oz) x 2/day, which pt was amenable towards receiving. Encourage and monitor oral intake, especially of nutrition supplement, as tolerated. RDN services to remain available as needed.

## 2025-06-16 ENCOUNTER — TRANSCRIPTION ENCOUNTER (OUTPATIENT)
Age: 89
End: 2025-06-16

## 2025-06-16 VITALS
DIASTOLIC BLOOD PRESSURE: 70 MMHG | TEMPERATURE: 98 F | OXYGEN SATURATION: 100 % | SYSTOLIC BLOOD PRESSURE: 131 MMHG | HEART RATE: 75 BPM | RESPIRATION RATE: 18 BRPM

## 2025-06-16 LAB
ANION GAP SERPL CALC-SCNC: 15 MMOL/L — HIGH (ref 7–14)
BUN SERPL-MCNC: 25 MG/DL — HIGH (ref 7–23)
CALCIUM SERPL-MCNC: 9.6 MG/DL — SIGNIFICANT CHANGE UP (ref 8.4–10.5)
CHLORIDE SERPL-SCNC: 107 MMOL/L — SIGNIFICANT CHANGE UP (ref 98–107)
CO2 SERPL-SCNC: 18 MMOL/L — LOW (ref 22–31)
CREAT SERPL-MCNC: 0.92 MG/DL — SIGNIFICANT CHANGE UP (ref 0.5–1.3)
EGFR: 56 ML/MIN/1.73M2 — LOW
EGFR: 56 ML/MIN/1.73M2 — LOW
GLUCOSE SERPL-MCNC: 93 MG/DL — SIGNIFICANT CHANGE UP (ref 70–99)
MAGNESIUM SERPL-MCNC: 2.2 MG/DL — SIGNIFICANT CHANGE UP (ref 1.6–2.6)
PHOSPHATE SERPL-MCNC: 3.3 MG/DL — SIGNIFICANT CHANGE UP (ref 2.5–4.5)
POTASSIUM SERPL-MCNC: 4 MMOL/L — SIGNIFICANT CHANGE UP (ref 3.5–5.3)
POTASSIUM SERPL-SCNC: 4 MMOL/L — SIGNIFICANT CHANGE UP (ref 3.5–5.3)
SODIUM SERPL-SCNC: 140 MMOL/L — SIGNIFICANT CHANGE UP (ref 135–145)

## 2025-06-16 PROCEDURE — 99239 HOSP IP/OBS DSCHRG MGMT >30: CPT

## 2025-06-16 RX ORDER — ATORVASTATIN CALCIUM 80 MG/1
1 TABLET, FILM COATED ORAL
Qty: 14 | Refills: 0
Start: 2025-06-16 | End: 2025-06-29

## 2025-06-16 RX ORDER — AMLODIPINE BESYLATE 10 MG/1
1 TABLET ORAL
Refills: 0 | DISCHARGE

## 2025-06-16 RX ORDER — MECLIZINE HCL 12.5 MG
1 TABLET ORAL
Qty: 6 | Refills: 0
Start: 2025-06-16 | End: 2025-06-17

## 2025-06-16 RX ADMIN — Medication 100 MILLIGRAM(S): at 09:38

## 2025-06-16 RX ADMIN — ENOXAPARIN SODIUM 30 MILLIGRAM(S): 100 INJECTION SUBCUTANEOUS at 05:10

## 2025-06-16 RX ADMIN — BRIMONIDINE TARTRATE 1 DROP(S): 1.5 SOLUTION/ DROPS OPHTHALMIC at 05:08

## 2025-06-16 RX ADMIN — Medication 81 MILLIGRAM(S): at 09:38

## 2025-06-16 RX ADMIN — Medication 1000 UNIT(S): at 11:09

## 2025-06-16 RX ADMIN — CYANOCOBALAMIN 500 MICROGRAM(S): 1000 INJECTION INTRAMUSCULAR; SUBCUTANEOUS at 11:09

## 2025-06-16 RX ADMIN — Medication 1 APPLICATION(S): at 11:08

## 2025-06-16 NOTE — DISCHARGE NOTE PROVIDER - CARE PROVIDER_API CALL
Mitch Oconnor  Gastroenterology  [address] [place] [address], [address]  [place] [address]  Phone: [phone]  Fax: [phone]  Follow Up Time: 2 weeks   Mitch Oconnor  Gastroenterology  [address] [facility], [address]  [place] [address]  Phone: [phone]  Fax: [phone]  Follow Up Time: 2 weeks    Rabia Alexander  Internal Medicine  [address] [place] [address]  Phone: [phone]  Fax: [phone]  Follow Up Time:

## 2025-06-16 NOTE — OCCUPATIONAL THERAPY INITIAL EVALUATION ADULT - NSOTDISCHREC_GEN_A_CORE
Recommend supervision/assist with functional activities which pt reports daughters can provide./No skilled OT needs

## 2025-06-16 NOTE — DISCHARGE NOTE PROVIDER - CARE PROVIDERS DIRECT ADDRESSES
[email or web address]

## 2025-06-16 NOTE — PROGRESS NOTE ADULT - PROBLEM SELECTOR PLAN 5
Hold metoprolol and amlodipine   orthostatics neg, BP acceptable off meds  f/u as outpt to evaluate need for antihypertensives
Hold metoprolol and amlodipine  check orthostatics  IVF, avoid hypotension

## 2025-06-16 NOTE — DISCHARGE NOTE PROVIDER - NSDCFUSCHEDAPPT_GEN_ALL_CORE_FT
[facility]  VASCULAR [address]  Scheduled Appointment: 07/31/2025    [facility]  VASCULAR [address]  Scheduled Appointment: 07/31/2025     Rabia Alexander  [facility]  INTMED [address]  Scheduled Appointment: 06/23/2025    [facility]  VASCULAR [address]  Scheduled Appointment: 07/31/2025    [facility]  VASCULAR [address]  Scheduled Appointment: 07/31/2025

## 2025-06-16 NOTE — DISCHARGE NOTE NURSING/CASE MANAGEMENT/SOCIAL WORK - FINANCIAL ASSISTANCE
[facility] provides services at a reduced cost to those who are determined to be eligible through [facility]’s financial assistance program. Information regarding [facility]’s financial assistance program can be found by going to [email or web address] or by calling [phone].

## 2025-06-16 NOTE — PROGRESS NOTE ADULT - PROBLEM SELECTOR PLAN 2
plt low in past, monitor/trend  leukopenia resolved
plt low in past, monitor/trend  leukopenia resolved

## 2025-06-16 NOTE — OCCUPATIONAL THERAPY INITIAL EVALUATION ADULT - GROOMING, PREVIOUS LEVEL OF FUNCTION, OT EVAL
No protocol for requested medication.    Medication: Adderall  Last office visit date: 1/20/25  Next office visit: 7/14/25  UDS: 3/5/25- in process  CSA: 1.21.25    Pharmacy: [facility]/PHARMACY #[id number] - [place] - [address]    Order pended, routed to clinician for review.      Patients wife has called numerous times demanding additional short fill due to UDS still pending, see phone encounters.   independent

## 2025-06-16 NOTE — DISCHARGE NOTE NURSING/CASE MANAGEMENT/SOCIAL WORK - PATIENT PORTAL LINK FT
You can access the FollowMyHealth Patient Portal offered by [facility] by registering at the following website: [email or web address]. By joining [employer]’s FollowMyHealth portal, you will also be able to view your health information using other applications (apps) compatible with our system.

## 2025-06-16 NOTE — OCCUPATIONAL THERAPY INITIAL EVALUATION ADULT - PERTINENT HX OF CURRENT PROBLEM, REHAB EVAL
As per EMR: "89 year old female past medical history HTN, hyperthyroidism, gout, PAD, history of DVT status post a/c, presenting from home with 5 days of dizziness when sitting, standing, and ambulating."  CT HEAD: No acute intracranial hemorrhage, mass effect, or midline shift.  Admit dx: Dizziness and giddiness, code stroke

## 2025-06-16 NOTE — DISCHARGE NOTE PROVIDER - NSDCMRMEDTOKEN_GEN_ALL_CORE_FT
allopurinol 100 mg oral tablet: 1 tab(s) orally once a day except Thursdays  amLODIPine 10 mg oral tablet: 1 tab(s) orally once a day  aspirin 81 mg oral tablet: 1 tab(s) orally Monday, Wednesday, and Friday  brimonidine 0.2% ophthalmic solution: 1 drop(s) to each affected eye 2 times a day  cholecalciferol 25 mcg (1000 intl units) oral tablet: 1 tab(s) orally once a day  cyanocobalamin 500 mcg oral tablet: 1 tab(s) orally once a day  methimazole 5 mg oral tablet: 1 tab(s) orally Monday through Friday  Metoprolol Succinate ER 25 mg oral tablet, extended release: 1 tab(s) orally once a day   allopurinol 100 mg oral tablet: 1 tab(s) orally once a day except Thursdays  aspirin 81 mg oral tablet: 1 tab(s) orally Monday, Wednesday, and Friday  brimonidine 0.2% ophthalmic solution: 1 drop(s) to each affected eye 2 times a day  cholecalciferol 25 mcg (1000 intl units) oral tablet: 1 tab(s) orally once a day  cyanocobalamin 500 mcg oral tablet: 1 tab(s) orally once a day  methimazole 5 mg oral tablet: 1 tab(s) orally Monday through Friday   allopurinol 100 mg oral tablet: 1 tab(s) orally once a day except Thursdays  aspirin 81 mg oral tablet: 1 tab(s) orally Monday, Wednesday, and Friday  atorvastatin 20 mg oral tablet: 1 tab(s) orally once a day (at bedtime)  brimonidine 0.2% ophthalmic solution: 1 drop(s) to each affected eye 2 times a day  cholecalciferol 25 mcg (1000 intl units) oral tablet: 1 tab(s) orally once a day  cyanocobalamin 500 mcg oral tablet: 1 tab(s) orally once a day  meclizine 12.5 mg oral tablet: 1 tab(s) orally 3 times a day as needed for Dizziness  methimazole 5 mg oral tablet: 1 tab(s) orally Monday through Friday  outpatient PT: 2-3x/week

## 2025-06-16 NOTE — PROGRESS NOTE ADULT - PROBLEM SELECTOR PLAN 1
appreciate neuro recs, likely BPPV, doubt ischemic event  MR w/wo contrast can be done as outpt per Neuro, no objection to dc per neuro  meclizine PRN  fall precautions, PT recs outpt PT  outpatient vestibular rehab   - Pt reports clinically improved, given 1L NS in ED, will give 1 more liter of NS 75ml/h  -Post discharge patient can follow at [address] [place] [address]. [address] [address].  Patient can call [phone] to schedule this appointment. If pt is without insurance, patient can follow up with [facility], located in [address] [place] [address] at [address] [place] [address]. Patient/family can call [phone] to schedule this appointment.  PT rec outpt PT  c/w asa  1L NS    orthostatics neg  Lipid panel reviewed (chol 224, ), started on atorvastatin 20mg ; TSH 4.06, A1c 5.1% (wnl)  UA neg

## 2025-06-16 NOTE — OCCUPATIONAL THERAPY INITIAL EVALUATION ADULT - DIAGNOSIS, OT EVAL
Pt with impaired standing balance and endurance impacting ability to complete ADLs, IADLs, functional mobility/transfers.

## 2025-06-16 NOTE — DISCHARGE NOTE PROVIDER - NSDCCPCAREPLAN_GEN_ALL_CORE_FT
PRINCIPAL DISCHARGE DIAGNOSIS  Diagnosis: Dizziness  Assessment and Plan of Treatment: You had ct scan which did not show any stroke, seen by neurologist, per neurology can follow up as outpatient for outpatient MRI. Please follow with Physical therapy as outpatient      SECONDARY DISCHARGE DIAGNOSES  Diagnosis: Essential hypertension  Assessment and Plan of Treatment: your bp is ok without medication, stop amlodipine and metoprolol, follow up with your pcp for resuming medication as needed     PRINCIPAL DISCHARGE DIAGNOSIS  Diagnosis: Dizziness  Assessment and Plan of Treatment: You had ct scan which did not show any stroke, seen by neurologist, per neurology can follow up as outpatient for outpatient MRI. Please follow with Physical therapy as outpatient      SECONDARY DISCHARGE DIAGNOSES  Diagnosis: Essential hypertension  Assessment and Plan of Treatment: your bp is ok without medication, stop amlodipine and metoprolol, follow up with your pcp for resuming medication as needed    Diagnosis: Hyperlipidemia  Assessment and Plan of Treatment: take the atorvastatin prescribed and follow up with pcp

## 2025-06-16 NOTE — PROGRESS NOTE ADULT - SUBJECTIVE AND OBJECTIVE BOX
Neurology Progress    WIL [id number]    HPI:  89 -year-old female with HTN, hyperthyroidism, gout, PAD, history of VTE s/p a/c, presenting from home with 5d of dizziness especially with sitting/standing/ambulating. Denies room-spinning sensation, reports feeling like she's going to fall. Notes fall on Tuesday onto buttocks, denies head trauma/LOC or pain after fall. Ambulates with cane at baseline. No headaches. Patient had admission in 2021 with dizziness thought to be secondary to orthostatic hypotension, BP med was discontinued at t that time. Came to ED 9/2024 with dizziness, seen by neuro and discharged home, did not have outpt f/u with a neurologist.    In the ED VS:  89  60-73  138/167/80-82  16-17  100%RA, received NS 1L IVF, meclizine 25mg PO x1 and metoclopramide 10mg IV x2 (14 Jun 2025 23:12)      Past Medical History  HTN (hypertension)    Hypothyroid    Osteoporosis    Pulmonary embolism    Uric acid serum decreased    Gout    Hyperthyroidism    History of DVT of lower extremity        Past Surgical History  S/P appendectomy        MEDICATIONS    allopurinol 100 milliGRAM(s) Oral <User Schedule>  aspirin enteric coated 81 milliGRAM(s) Oral <User Schedule>  atorvastatin 20 milliGRAM(s) Oral at bedtime  brimonidine 0.2% Ophthalmic Solution 1 Drop(s) Both EYES two times a day  chlorhexidine 2% Cloths 1 Application(s) Topical daily  cholecalciferol 1000 Unit(s) Oral daily  cyanocobalamin 500 MICROGram(s) Oral daily  enoxaparin Injectable 30 milliGRAM(s) SubCutaneous every 24 hours  meclizine 12.5 milliGRAM(s) Oral three times a day PRN  methIMAzole 5 milliGRAM(s) Oral <User Schedule>  ondansetron Injectable 4 milliGRAM(s) IV Push every 8 hours PRN         Family history: No history of dementia, strokes, or seizures   FAMILY HISTORY:  No pertinent family history in first degree relatives      SOCIAL HISTORY -- No history of tobacco or alcohol use     Allergies    No Known Allergies    Intolerances        Height (cm): 152.4 (06-15 @ 01:20)  Weight (kg): 52.2 (06-15 @ 01:20)  BMI (kg/m2): 22.5 (06-15 @ 01:20)    Vital Signs Last 24 Hrs  T(C): 36.4 (15 Davon 2025 08:00), Max: 36.7 (14 Jun 2025 12:44)  T(F): 97.5 (15 Davon 2025 08:00), Max: 98 (14 Jun 2025 12:44)  HR: 61 (15 Davon 2025 08:00) (60 - 81)  BP: 101/68 (15 Davon 2025 08:00) (101/68 - 167/80)  BP(mean): --  RR: 16 (15 Davon 2025 08:00) (16 - 18)  SpO2: 98% (15 Davon 2025 08:00) (98% - 100%)    Parameters below as of 15 Davon 2025 08:00  Patient On (Oxygen Delivery Method): room air            On Neurological Examination:    Mental Status - Patient is alert, awake, oriented X3. .   Follows commands well and able to answer questions appropriately. Mood and affect  normal  Follow simple commands able to repeat  able to name.  Speech - Fluent no Dysarthria  no  Aphasia                              Cranial Nerves - Extraocular muscle intact  ANNA Facial symmetry Tongue midline, CnV1to V3 intact gross hearing intact      Motor Exam -   Right upper  5/5 throughout  Left upper 5/5 throughtout  Right lower- 5/5 throughout  Left lower 5/5 throughout  Coordination -finger to nose intact  Muscle tone - is normal all over. No asymmetry is seen.      Sensory    Bilateral intact to light touch    Gait -  normal  no ataxia     GENERAL Exam:     Nontoxic , No Acute Distress   	  HEENT:  normocephalic, atraumatic  		  LUNGS:	Clear bilaterally  No Wheeze      VASCULAR: no carotid brui  	  HEART:	 Normal S1S2   No murmur RRR        	  MUSCULOSKELETAL: Normal Range of Motion  	   SKIN:      Normal   No Ecchymosis               LABS:  CBC Full  -  ( 15 Davon 2025 05:10 )  WBC Count : 5.10 K/uL  RBC Count : 4.16 M/uL  Hemoglobin : 14.0 g/dL  Hematocrit : 40.1 %  Platelet Count - Automated : 133 K/uL  Mean Cell Volume : 96.4 fL  Mean Cell Hemoglobin : 33.7 pg  Mean Cell Hemoglobin Concentration : 34.9 g/dL  Auto Neutrophil # : x  Auto Lymphocyte # : x  Auto Monocyte # : x  Auto Eosinophil # : x  Auto Basophil # : x  Auto Neutrophil % : x  Auto Lymphocyte % : x  Auto Monocyte % : x  Auto Eosinophil % : x  Auto Basophil % : x    Urinalysis Basic - ( 15 Davon 2025 05:10 )    Color: x / Appearance: x / SG: x / pH: x  Gluc: 97 mg/dL / Ketone: x  / Bili: x / Urobili: x   Blood: x / Protein: x / Nitrite: x   Leuk Esterase: x / RBC: x / WBC x   Sq Epi: x / Non Sq Epi: x / Bacteria: x      06-15    142  |  106  |  12  ----------------------------<  97  3.3[L]   |  21[L]  |  0.74    Ca    9.9      15 Davon 2025 05:10  Phos  3.0     06-15  Mg     2.10     06-15    TPro  8.2  /  Alb  4.4  /  TBili  0.4  /  DBili  x   /  AST  15  /  ALT  16  /  AlkPhos  91  06-14    Hemoglobin A1C:   Lipid Panel 06-15 @ 05:10  Total Cholesterol, Serum 224  LDL --  Triglycerides 86    LIVER FUNCTIONS - ( 14 Jun 2025 14:08 )  Alb: 4.4 g/dL / Pro: 8.2 g/dL / ALK PHOS: 91 U/L / ALT: 16 U/L / AST: 15 U/L / GGT: x           Vitamin B12         RADIOLOGY    EKG                schoenberg 
Dr. Cheryl Akhtar  Pager [phone]    PROGRESS NOTE:     Patient is a 89y old  Female who presents with a chief complaint of dizziness (15 Davon 2025 10:38)      SUBJECTIVE / OVERNIGHT EVENTS: denies chest pain or sob , reports dizziness resolved, wants to go home soon   ADDITIONAL REVIEW OF SYSTEMS: afebrile     MEDICATIONS  (STANDING):  allopurinol 100 milliGRAM(s) Oral <User Schedule>  aspirin enteric coated 81 milliGRAM(s) Oral <User Schedule>  atorvastatin 20 milliGRAM(s) Oral at bedtime  brimonidine 0.2% Ophthalmic Solution 1 Drop(s) Both EYES two times a day  chlorhexidine 2% Cloths 1 Application(s) Topical daily  cholecalciferol 1000 Unit(s) Oral daily  cyanocobalamin 500 MICROGram(s) Oral daily  enoxaparin Injectable 30 milliGRAM(s) SubCutaneous every 24 hours  methIMAzole 5 milliGRAM(s) Oral <User Schedule>    MEDICATIONS  (PRN):  meclizine 12.5 milliGRAM(s) Oral three times a day PRN Dizziness  ondansetron Injectable 4 milliGRAM(s) IV Push every 8 hours PRN Nausea and/or Vomiting      CAPILLARY BLOOD GLUCOSE        I&O's Summary      PHYSICAL EXAM:  Vital Signs Last 24 Hrs  T(C): 36.5 (15 Davon 2025 12:00), Max: 36.5 (14 Jun 2025 18:03)  T(F): 97.7 (15 Davon 2025 12:00), Max: 97.7 (14 Jun 2025 18:03)  HR: 80 (15 Davon 2025 12:00) (61 - 81)  BP: 133/82 (15 Davon 2025 12:00) (101/68 - 167/80)  BP(mean): --  RR: 16 (15 Davon 2025 12:00) (16 - 18)  SpO2: 98% (15 Davon 2025 12:00) (98% - 100%)    Parameters below as of 15 Davon 2025 12:00  Patient On (Oxygen Delivery Method): room air      CONSTITUTIONAL: nad, elderly frail woman  RESPIRATORY: Normal respiratory effort; lungs are clear to auscultation bilaterally  CARDIOVASCULAR: Regular rate and rhythm, normal S1 and S2, no murmur/rub/gallop; No lower extremity edema; Peripheral pulses are 2+ bilaterally  ABDOMEN: Nontender to palpation, normoactive bowel sounds, no rebound/guarding; No hepatosplenomegaly  MUSCULOSKELETAL: no clubbing or cyanosis of digits; no joint swelling or tenderness to palpation  PSYCH: A+O to person, place, and time; affect appropriate    LABS:                        14.0   5.10  )-----------( 133      ( 15 Davon 2025 05:10 )             40.1     06-15    142  |  106  |  12  ----------------------------<  97  3.3[L]   |  21[L]  |  0.74    Ca    9.9      15 Davon 2025 05:10  Phos  3.0     06-15  Mg     2.10     06-15    TPro  8.2  /  Alb  4.4  /  TBili  0.4  /  DBili  x   /  AST  15  /  ALT  16  /  AlkPhos  91  06-14      Urinalysis Basic - ( 15 Davon 2025 05:10 )    Color: x / Appearance: x / SG: x / pH: x  Gluc: 97 mg/dL / Ketone: x  / Bili: x / Urobili: x   Blood: x / Protein: x / Nitrite: x   Leuk Esterase: x / RBC: x / WBC x   Sq Epi: x / Non Sq Epi: x / Bacteria: x    Urinalysis with Rflx Culture (collected 15 Davon 2025 00:00)    RADIOLOGY & ADDITIONAL TESTS:  Results Reviewed:   Imaging Personally Reviewed:  < from: CT Angio Head w/ IV Cont (06.14.25 @ 18:19) >    IMPRESSION:    CT HEAD:  No acute intracranial hemorrhage, mass effect, or midline shift.    CTA NECK:  No evidence of significant stenosis or occlusion.  Enlarged heterogenous multinodular thyroid.    CTA HEAD:  No large vessel occlusion or significant stenosis.  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          Electrocardiogram Personally Reviewed:    COORDINATION OF CARE:  Care Discussed with Consultants/Other Providers [Y/N]:  Prior or Outpatient Records Reviewed [Y/N]:  
Patient is a 89y old  Female who presents with a chief complaint of dizziness (16 Jun 2025 13:43)      SUBJECTIVE / OVERNIGHT EVENTS: Pt seen and examined at 11:15am, no overnight events, pt denies any dizziness, chest pain, sob or any other complaints, no other new issues reported.    MEDICATIONS  (STANDING):  allopurinol 100 milliGRAM(s) Oral <User Schedule>  aspirin enteric coated 81 milliGRAM(s) Oral <User Schedule>  atorvastatin 20 milliGRAM(s) Oral at bedtime  brimonidine 0.2% Ophthalmic Solution 1 Drop(s) Both EYES two times a day  chlorhexidine 2% Cloths 1 Application(s) Topical daily  cholecalciferol 1000 Unit(s) Oral daily  cyanocobalamin 500 MICROGram(s) Oral daily  enoxaparin Injectable 30 milliGRAM(s) SubCutaneous every 24 hours  methIMAzole 5 milliGRAM(s) Oral <User Schedule>    MEDICATIONS  (PRN):  meclizine 12.5 milliGRAM(s) Oral three times a day PRN Dizziness  ondansetron Injectable 4 milliGRAM(s) IV Push every 8 hours PRN Nausea and/or Vomiting      Vital Signs Last 24 Hrs  T(C): 36.6 (16 Jun 2025 12:00), Max: 36.9 (16 Jun 2025 08:00)  T(F): 97.9 (16 Jun 2025 12:00), Max: 98.4 (16 Jun 2025 08:00)  HR: 75 (16 Jun 2025 12:00) (75 - 86)  BP: 131/70 (16 Jun 2025 12:00) (117/70 - 151/80)  BP(mean): --  RR: 18 (16 Jun 2025 12:00) (17 - 18)  SpO2: 100% (16 Jun 2025 12:00) (98% - 100%)    Parameters below as of 16 Jun 2025 12:00  Patient On (Oxygen Delivery Method): room air      CAPILLARY BLOOD GLUCOSE        I&O's Summary      PHYSICAL EXAM:  GENERAL: NAD  CHEST/LUNG: Clear to auscultation bilaterally; No wheeze  HEART: Regular rate and rhythm  ABDOMEN: Soft, Nontender, Nondistended  EXTREMITIES: no LE edema  PSYCH: calm  NEUROLOGY: AA oriented to self, knows that she is in the hospital, yr 2025, month june  SKIN: No rashes or lesions    LABS:                        14.0   5.10  )-----------( 133      ( 15 Davon 2025 05:10 )             40.1     06-16    140  |  107  |  25[H]  ----------------------------<  93  4.0   |  18[L]  |  0.92    Ca    9.6      16 Jun 2025 04:15  Phos  3.3     06-16  Mg     2.20     06-16            Urinalysis Basic - ( 16 Jun 2025 04:15 )    Color: x / Appearance: x / SG: x / pH: x  Gluc: 93 mg/dL / Ketone: x  / Bili: x / Urobili: x   Blood: x / Protein: x / Nitrite: x   Leuk Esterase: x / RBC: x / WBC x   Sq Epi: x / Non Sq Epi: x / Bacteria: x        RADIOLOGY & ADDITIONAL TESTS:    Imaging Personally Reviewed:    Consultant(s) Notes Reviewed:      Care Discussed with Consultants/Other Providers:

## 2025-06-16 NOTE — DISCHARGE NOTE PROVIDER - HOSPITAL COURSE
89 -year-old female with HTN, hyperthyroidism, gout, PAD, history of VTE s/p a/c, presenting from home with 5d of dizziness especially with sitting/standing/ambulating.     Dizziness.   appreciate neuro recs, likely BPPV, doubt ischemic event  MR w/wo contrast ordered, per neuro can be done as outpt and pt can be dc  meclizine PRN  fall precautions, PT recs outpt PT  outpatient vestibular rehab   - Pt reports clinically improved, given 1L NS in ED, will give 1 more liter of NS 75ml/h  -Post discharge patient can follow at [address] [place] [address]. [address] [address].  Patient can call [phone] to schedule this appointment. If pt is without insurance, patient can follow up with [facility], located in [address] [place] [address] at [address] [place] [phone]. Patient/family can call [phone] to schedule this appointment.  PT rec outpt PT  c/w asa  1L NS   orthostatics neg  Lipid panel reviewed (chol 224, ), start atorvastatin 20mg ; TSH 4.06, A1c 5.1% (wnl)  UA neg.     Bicytopenia.   plt low in past, monitor/trend  leukopenia resolved.     Hyperthyroidism.    c/w meclizine  TSH 4.06, check FT4.    Gout.   c/w allopurinol home regimen.    Essential hypertension.    Hold metoprolol and amlodipine  orthostatics neg       PAD (peripheral artery disease).   c/w ASA home regimen.    Stage 3 chronic kidney disease.   chronic/stable renal function  renally dose meds, avoid nephrotoxins     89 -year-old female with HTN, hyperthyroidism, gout, PAD, history of VTE s/p a/c, presenting from home with 5d of dizziness especially with sitting/standing/ambulating.     Dizziness.   appreciate neuro recs, likely BPPV, doubt ischemic event  MR w/wo contrast ordered, per neuro can be done as outpt and pt can be dc  meclizine PRN  fall precautions, PT recs outpt PT  outpatient vestibular rehab   - Pt reports clinically improved, given 1L NS in ED, will give 1 more liter of NS 75ml/h  -Post discharge patient can follow at [address] [place] [address]. [address] [address].  Patient can call [phone] to schedule this appointment. If pt is without insurance, patient can follow up with [facility], located in [address] [place] [address] at [address] [place] [phone]. Patient/family can call [phone] to schedule this appointment.  PT rec outpt PT  c/w asa  1L NS   orthostatics neg  Lipid panel reviewed (chol 224, ), start atorvastatin 20mg ; TSH 4.06, A1c 5.1% (wnl)  UA neg.     Bicytopenia.   plt low in past, monitor/trend  leukopenia resolved.     Hyperthyroidism.    c/w methimazole  TSH 4.06, check FT4.    Gout.   c/w allopurinol home regimen.    Essential hypertension.    Hold metoprolol and amlodipine  orthostatics neg       PAD (peripheral artery disease).   c/w ASA home regimen.    Stage 3 chronic kidney disease.   chronic/stable renal function  renally dose meds, avoid nephrotoxins  stable for dc     89 -year-old female with HTN, hyperthyroidism, gout, PAD, history of VTE s/p a/c, presenting from home with 5d of dizziness especially with sitting/standing/ambulating.     Dizziness.   appreciate neuro recs, likely BPPV, doubt ischemic event  MR w/wo contrast ordered, per neuro can be done as outpt and pt can be dc  meclizine PRN  fall precautions, PT recs outpt PT  outpatient vestibular rehab   - Pt reports clinically improved, given 1L NS in ED, will give 1 more liter of NS 75ml/h  -Post discharge patient can follow at [address] [place] [address]. [address] [address].  Patient can call [phone] to schedule this appointment. If pt is without insurance, patient can follow up with [facility], located in [address] [place] [address] at [address] [place] [phone]. Patient/family can call [phone] to schedule this appointment.  PT rec outpt PT  c/w asa  1L NS   orthostatics neg  Lipid panel reviewed (chol 224, ), start atorvastatin 20mg ; TSH 4.06, A1c 5.1% (wnl)  UA neg.     Bicytopenia.   plt low in past, monitor/trend  leukopenia resolved.     Hyperthyroidism.    c/w methimazole  TSH 4.06, check FT4.    Gout.   c/w allopurinol home regimen.    Essential hypertension.    Hold metoprolol and amlodipine  orthostatics neg       PAD (peripheral artery disease).   c/w ASA home regimen.    Stage 3 chronic kidney disease.   chronic/stable renal function  renally dose meds, avoid nephrotoxins    stable for dc  Spoke to daughter AYANNAA about the medication changes, told to stop norvasc and amlodipine, lipitor started

## 2025-06-16 NOTE — PROGRESS NOTE ADULT - PROBLEM SELECTOR PLAN 8
enoxaparin for VTE ppx  diet- DASH, passed dysphagia screen, aspiration precautions    dc today, dc planning time spent in coordination 40mts ( preparing dc summary and plan) enoxaparin for VTE ppx  diet- DASH, passed dysphagia screen, aspiration precautions  Spoke to daughter AMA and updated on 6/16    dc today, dc planning time spent in coordination 40mts ( preparing dc summary and plan)  Plan discussed with ACP

## 2025-06-16 NOTE — DISCHARGE NOTE PROVIDER - NSDCFUADDAPPT_GEN_ALL_CORE_FT
APPTS ARE READY TO BE MADE: [x] YES    Best Family or Patient Contact (if needed): gerard Yung [phone]    Additional Information about above appointments (if needed):    1: Dr. dawn  2:   3:     Other comments or requests:    APPTS ARE READY TO BE MADE: [x] YES    Best Family or Patient Contact (if needed): daughter Aga [phone]    Additional Information about above appointments (if needed):    1: Dr. dawn  2:   3:     Other comments or requests:     Appointment was scheduled in [facility]. Patient was scheduled for an appointment on 06/23/25 11:20am with Dr. Rabia Dawn.     Patient informed us they already have secured a follow up appointment which is not visible on [facility]. Patient was previously scheduled for an appointment on 07/11/25 with Dr. Mitch Oconnor. - I called the office for an earlier appointment and they are going to call the patient back.     Appointment was scheduled by our team on the patient's behalf through the provider's office. Patient was scheduled for an appointment on 07/09/25 2:00pm at [address] [place] [address] with Dr. Ger Granados.

## 2025-06-16 NOTE — OCCUPATIONAL THERAPY INITIAL EVALUATION ADULT - LIVES WITH, PROFILE
Pt resides in a house with her 2 daughter, 4 steps to enter, bedroom on second floor, bathroom has a walk-in shower with a shower chair. Pt was ambulating with a cane. and owns a rolling walker/children

## 2025-06-16 NOTE — OCCUPATIONAL THERAPY INITIAL EVALUATION ADULT - GENERAL OBSERVATIONS, REHAB EVAL
Pt received semisupine in bed in NAD, all lines intact +tele. Pt OK to be seen for OT per PATY Arredondo. HR 82 BPM.

## 2025-06-16 NOTE — DISCHARGE NOTE PROVIDER - PROVIDER TOKENS
PROVIDER:[TOKEN:[[id number]],FOLLOWUP:[2 weeks]] PROVIDER:[TOKEN:[[id number]],FOLLOWUP:[2 weeks]],PROVIDER:[TOKEN:[[id number]]]

## 2025-06-16 NOTE — PROGRESS NOTE ADULT - ASSESSMENT
89 -year-old female with HTN, hyperthyroidism, gout, PAD, history of VTE s/p a/c, presenting from home with 5d of dizziness especially with sitting/standing/ambulating. 
This is 89 year old female with dizziness. Most likely BPV  meclizine PRN  PT walking in hallway well
89 -year-old female with HTN, hyperthyroidism, gout, PAD, history of VTE s/p a/c, presenting from home with 5d of dizziness especially with sitting/standing/ambulating.

## 2025-06-16 NOTE — DISCHARGE NOTE PROVIDER - NSFOLLOWUPCLINICS_GEN_ALL_ED_FT
Stroke Discharge Program  Neurology  [address] [facility], [address]  [place] [address]  Phone: [phone]  Fax:

## 2025-06-16 NOTE — PROGRESS NOTE ADULT - PROBLEM SELECTOR PLAN 7
chronic/stable renal function  renally dose meds, avoid nephrotoxins  trend Cr
chronic/stable renal function  renally dose meds, avoid nephrotoxins  trend Cr

## 2025-06-16 NOTE — PHARMACOTHERAPY INTERVENTION NOTE - COMMENTS
Discharge medications were reviewed with the patient and family at bedside. Current medication schedule was discussed in detail including: medication name, indication, dose, administration times, side effects, and special instructions. All questions and concerns were answered and addressed. Patient and family verbalized understanding and were provided with educational handout.    rBee Dan, PharmD, [facility]  Clinical Pharmacy Specialist  [facility] [phone]

## 2025-06-16 NOTE — CHART NOTE - NSCHARTNOTEFT_GEN_A_CORE
Pt's vertigo has improvement. As documented in previous neurology note, low concern for ischemic stroke. Patient may obtain MRI Brain as an outpatient. No neurologic contraindication to discharge Pt's vertigo has improved. As documented in previous neurology note, low concern for ischemic stroke. Patient may obtain MRI Brain as an outpatient. No neurologic contraindication to discharge

## 2025-06-17 PROBLEM — Z86.718 PERSONAL HISTORY OF OTHER VENOUS THROMBOSIS AND EMBOLISM: Chronic | Status: ACTIVE | Noted: 2025-06-15

## 2025-06-18 ENCOUNTER — TRANSCRIPTION ENCOUNTER (OUTPATIENT)
Age: 89
End: 2025-06-18

## 2025-06-23 ENCOUNTER — APPOINTMENT (OUTPATIENT)
Age: 89
End: 2025-06-23
Payer: MEDICARE

## 2025-06-23 PROBLEM — R42 DIZZINESS: Status: ACTIVE | Noted: 2025-06-23

## 2025-06-23 PROBLEM — I10 HYPERTENSION: Status: ACTIVE | Noted: 2025-06-23

## 2025-06-23 PROBLEM — E05.90 HYPERTHYROIDISM: Status: ACTIVE | Noted: 2025-06-23

## 2025-06-23 PROBLEM — E78.5 HYPERLIPIDEMIA: Status: ACTIVE | Noted: 2025-06-23

## 2025-06-23 PROCEDURE — 99495 TRANSJ CARE MGMT MOD F2F 14D: CPT | Mod: 2W

## 2025-07-31 ENCOUNTER — APPOINTMENT (OUTPATIENT)
Dept: VASCULAR SURGERY | Facility: CLINIC | Age: 89
End: 2025-07-31

## 2025-07-31 ENCOUNTER — NON-APPOINTMENT (OUTPATIENT)
Age: 89
End: 2025-07-31

## 2025-07-31 VITALS — DIASTOLIC BLOOD PRESSURE: 83 MMHG | SYSTOLIC BLOOD PRESSURE: 142 MMHG | HEART RATE: 70 BPM

## 2025-07-31 VITALS
TEMPERATURE: 97.6 F | DIASTOLIC BLOOD PRESSURE: 84 MMHG | SYSTOLIC BLOOD PRESSURE: 150 MMHG | WEIGHT: 117 LBS | HEART RATE: 68 BPM | HEIGHT: 60 IN | BODY MASS INDEX: 22.97 KG/M2

## 2025-07-31 PROCEDURE — 99212 OFFICE O/P EST SF 10 MIN: CPT

## 2025-07-31 PROCEDURE — 93923 UPR/LXTR ART STDY 3+ LVLS: CPT
